# Patient Record
Sex: FEMALE | Race: WHITE | Employment: OTHER | URBAN - METROPOLITAN AREA
[De-identification: names, ages, dates, MRNs, and addresses within clinical notes are randomized per-mention and may not be internally consistent; named-entity substitution may affect disease eponyms.]

---

## 2020-12-21 ENCOUNTER — TELEPHONE (OUTPATIENT)
Dept: INTERNAL MEDICINE CLINIC | Facility: CLINIC | Age: 60
End: 2020-12-21

## 2020-12-21 ENCOUNTER — OFFICE VISIT (OUTPATIENT)
Dept: INTERNAL MEDICINE CLINIC | Facility: CLINIC | Age: 60
End: 2020-12-21
Payer: COMMERCIAL

## 2020-12-21 VITALS
BODY MASS INDEX: 32.18 KG/M2 | TEMPERATURE: 99 F | HEART RATE: 78 BPM | DIASTOLIC BLOOD PRESSURE: 110 MMHG | OXYGEN SATURATION: 99 % | SYSTOLIC BLOOD PRESSURE: 180 MMHG | RESPIRATION RATE: 18 BRPM | WEIGHT: 205 LBS | HEIGHT: 67 IN

## 2020-12-21 DIAGNOSIS — G47.09 OTHER INSOMNIA: ICD-10-CM

## 2020-12-21 DIAGNOSIS — Z01.84 COVID-19 VIRUS IGM ANTIBODY TEST RESULT UNKNOWN: ICD-10-CM

## 2020-12-21 DIAGNOSIS — G44.89 OTHER HEADACHE SYNDROME: ICD-10-CM

## 2020-12-21 DIAGNOSIS — I10 PRIMARY HYPERTENSION: ICD-10-CM

## 2020-12-21 DIAGNOSIS — E78.5 HYPERLIPIDEMIA, UNSPECIFIED HYPERLIPIDEMIA TYPE: Primary | ICD-10-CM

## 2020-12-21 PROCEDURE — 36415 COLL VENOUS BLD VENIPUNCTURE: CPT | Performed by: NURSE PRACTITIONER

## 2020-12-21 PROCEDURE — 3080F DIAST BP >= 90 MM HG: CPT | Performed by: NURSE PRACTITIONER

## 2020-12-21 PROCEDURE — 3008F BODY MASS INDEX DOCD: CPT | Performed by: NURSE PRACTITIONER

## 2020-12-21 PROCEDURE — 3077F SYST BP >= 140 MM HG: CPT | Performed by: NURSE PRACTITIONER

## 2020-12-21 PROCEDURE — 99203 OFFICE O/P NEW LOW 30 MIN: CPT | Performed by: NURSE PRACTITIONER

## 2020-12-21 RX ORDER — LISINOPRIL AND HYDROCHLOROTHIAZIDE 12.5; 1 MG/1; MG/1
1 TABLET ORAL DAILY
Qty: 30 TABLET | Refills: 1 | Status: SHIPPED | OUTPATIENT
Start: 2020-12-21 | End: 2020-12-29 | Stop reason: ALTCHOICE

## 2020-12-21 NOTE — PROGRESS NOTES
BP at home.    Dec 15  -  128/89  96  Dec 16  -  156/103 84   Dec 17  -  152/101  86   Dec 18  -  154/108 86    Dec 20  -  151/91  86                   165/99  88    HA's on/off, today mild HA, No SOB, CP, lightheadedness/dizziness, numbness/tingling to glynn

## 2020-12-21 NOTE — PATIENT INSTRUCTIONS
ASSESSMENT/PLAN:   Hyperlipidemia, unspecified hyperlipidemia type  (primary encounter diagnosis)  Check blood 1-21    Primary hypertension  Careful with diet and excercise at least 30 minutes 3-4 times a week.  Check blood pressures at different times on salt  · Dry beans, cooked without salt  · Tofu, stir-fried without salt  · Unsalted fresh fruit and vegetables, or frozen or canned fruit and vegetables with no added salt  Stay away from  · Lunch or deli meat that is cured or smoked  · Cheese  · Tomato ju such as fatty meats, whole milk, cheese, and palm and coconut oils. Avoid trans fats because they lower good cholesterol as well as raise bad cholesterol.  Trans fats are most often found in processed foods, such as pastries, cookies, pies, muffins, fried f is another way to reduce the amount of fat you eat. · Low-fat and nonfat dairy provide nutrients without a lot of fat. Try low-fat or nonfat milk, cheese, or yogurt. · Healthy fats can be good for you in small amounts.  These are unsaturated fats, such as care. Always follow your healthcare professional's instructions. Exercise for a Healthier Heart     Exercise with a friend. When activity is fun, you're more likely to stick with it. You may wonder how you can improve the health of your heart.  If 75 minutes (1 hour and 15 minutes) or more of vigorous-intensity aerobic activity each week. Or try for a combination of both. Moderate activity means that you breathe heavier and your heart rate increases but you can still talk.  Think about doing 40 minut relieved with self-care. But some headaches may be a sign of another health problem like eye trouble or high blood pressure. To find the best treatment, learn what kind of headaches you get. For tension headaches, self-care will usually help.  To treat migr computer  Signs of migraine  Any of the following can be signs:   · Throbbing pain on one or both sides of your head  · Nausea or vomiting  · Extreme sensitivity to light, sound, and smells  · Bright spots, flashes, or other visual changes  · Pain or nause

## 2020-12-21 NOTE — TELEPHONE ENCOUNTER
Patient contacts clinic as new patient to establish care and report high blood pressure readings. Las few readings were 156/103, 154/108, 165/99. She has a mild, dull headache.   She denies dizziness, lightheadedness, blurred vision, chest pain, shortness

## 2020-12-21 NOTE — PROGRESS NOTES
HPI:    Patient ID: Kevin Merino is a 56year old female. Patient presents to the clinic to establish care. Moved from [place]. Records not in EMR. Patient states she was previously on blood pressure medication 5 years ago.   But was tapered off Cardiovascular: Negative. Gastrointestinal: Negative. Endocrine: Negative. Genitourinary: Negative. Musculoskeletal: Negative. Skin: Negative. Allergic/Immunologic: Negative. Neurological: Positive for headaches.  Negative for dizzine well-developed and well-nourished. She is active. Non-toxic appearance. She does not have a sickly appearance. She does not appear ill. No distress. HENT:   Head: Normocephalic and atraumatic.    Right Ear: Hearing and external ear normal. No decreased h preauricular and no posterior auricular adenopathy present. Head (left side): No submental, no submandibular, no tonsillar, no preauricular and no posterior auricular adenopathy present.    Neurological: She is alert and oriented to person, place, an

## 2020-12-22 DIAGNOSIS — E83.52 SERUM CALCIUM ELEVATED: Primary | ICD-10-CM

## 2020-12-24 ENCOUNTER — PATIENT MESSAGE (OUTPATIENT)
Dept: INTERNAL MEDICINE CLINIC | Facility: CLINIC | Age: 60
End: 2020-12-24

## 2020-12-24 NOTE — TELEPHONE ENCOUNTER
From: Bhargav Moura  To: VIKKI Rivas  Sent: 12/24/2020 5:07 PM CST  Subject: Non-Urgent Medical Question    I am experiencing some pretty heavy abdominal pain after starting the Lisinopril HCTZ/10-12.5mg, also some light headedness.

## 2020-12-26 NOTE — TELEPHONE ENCOUNTER
I have never seen this patient either. She was seen by [facility]. Will forward to her.

## 2020-12-28 ENCOUNTER — TELEPHONE (OUTPATIENT)
Dept: INTERNAL MEDICINE CLINIC | Facility: CLINIC | Age: 60
End: 2020-12-28

## 2020-12-28 ENCOUNTER — NURSE ONLY (OUTPATIENT)
Dept: INTERNAL MEDICINE CLINIC | Facility: CLINIC | Age: 60
End: 2020-12-28
Payer: COMMERCIAL

## 2020-12-28 DIAGNOSIS — Z01.30 BLOOD PRESSURE CHECK: Primary | ICD-10-CM

## 2020-12-28 DIAGNOSIS — Z01.30 BP CHECK: Primary | ICD-10-CM

## 2020-12-28 PROCEDURE — 3079F DIAST BP 80-89 MM HG: CPT | Performed by: INTERNAL MEDICINE

## 2020-12-28 PROCEDURE — 3077F SYST BP >= 140 MM HG: CPT | Performed by: INTERNAL MEDICINE

## 2020-12-28 NOTE — TELEPHONE ENCOUNTER
Patient came in for Blood pressure check \.  BP manually checked on Right Arm sitting 150/80. Patient own BP Machine read 142/88 pulse 103. Patient started to take Lisinopril-hctz 10-12.5mg since last Monday 12/21 and developed a Dry Cough please advise.

## 2020-12-29 RX ORDER — LOSARTAN POTASSIUM AND HYDROCHLOROTHIAZIDE 12.5; 5 MG/1; MG/1
1 TABLET ORAL DAILY
Qty: 30 TABLET | Refills: 1 | Status: SHIPPED | OUTPATIENT
Start: 2020-12-29 | End: 2021-01-11

## 2020-12-29 NOTE — TELEPHONE ENCOUNTER
Spoke to patient develop a consistent cough while taking lisinopril. Took today. Denies chest pain, palpitations, shortness of breath. Advised to continue checking blood pressures daily various times a day. Send blood pressures in 1 to 2 weeks.   Barbie

## 2020-12-29 NOTE — TELEPHONE ENCOUNTER
Please check who has seen the patient even if the PCP is me I may have never seen the patient. Sent to the wrong provider.

## 2021-01-05 ENCOUNTER — NURSE TRIAGE (OUTPATIENT)
Dept: INTERNAL MEDICINE CLINIC | Facility: CLINIC | Age: 61
End: 2021-01-05

## 2021-01-05 ENCOUNTER — PATIENT MESSAGE (OUTPATIENT)
Dept: INTERNAL MEDICINE CLINIC | Facility: CLINIC | Age: 61
End: 2021-01-05

## 2021-01-05 NOTE — TELEPHONE ENCOUNTER
Please triage patient. From: Danyel Beaver  To: VIKKI Mcclelland  Sent: 1/5/2021  8:38 AM CST  Subject: Other    I am flat on my back for the past few days and i think it might be the Losartin. Please let me know what i should do.     Danyel Beaver

## 2021-01-05 NOTE — TELEPHONE ENCOUNTER
From: Belkys London  To: VIKKI Saldana  Sent: 1/5/2021 8:38 AM CST  Subject: Other    I am flat on my back for the past few days and i think it might be the Losartin. Please let me know what i should do.     Belkys London

## 2021-01-06 NOTE — TELEPHONE ENCOUNTER
Action Requested: Summary for Provider     []  Critical Lab, Recommendations Needed  [] Need Additional Advice  [x]   FYI    []   Need Orders  [] Need Medications Sent to Pharmacy  []  Other     SUMMARY: Verified name and .   Patient reports that she has

## 2021-01-07 NOTE — TELEPHONE ENCOUNTER
Not sure why that she was advised to stop blood pressure medication with high blood pressure. No notes in EHR. Where was patient seen? Please schedule follow-up office visit sooner.

## 2021-01-07 NOTE — TELEPHONE ENCOUNTER
Patient stated that she is currently in the car. Still having back pain but able to walk. Just got back from the back specialist. Put her on a steroid and is going to have an MRI. Patient was advised to stop the losartan/hctz- stopped it yesterday.  Blood p

## 2021-01-07 NOTE — TELEPHONE ENCOUNTER
Spoke with pt,  verified  Pt informed of [facility]. Pt saw ortho surgeon from [facility] this morning due to disk problem, he said losartan could be reaction from her coughing which was the side effect from lisinopril.   Pt was switched from lakisha

## 2021-01-08 NOTE — TELEPHONE ENCOUNTER
Patient called and informed of Nicki Valentine message. Agreed to soonest appointment. Re-scheduled for 01/11 at [facility] office. Patient has new insurance information.  Phone call transferred to [facility] to assist

## 2021-01-08 NOTE — TELEPHONE ENCOUNTER
Noted.  Due to High blood pressure patient should still follow-up in the clinic sooner than scheduled.

## 2021-01-11 ENCOUNTER — OFFICE VISIT (OUTPATIENT)
Dept: INTERNAL MEDICINE CLINIC | Facility: CLINIC | Age: 61
End: 2021-01-11
Payer: COMMERCIAL

## 2021-01-11 VITALS
HEART RATE: 103 BPM | SYSTOLIC BLOOD PRESSURE: 180 MMHG | OXYGEN SATURATION: 97 % | BODY MASS INDEX: 32 KG/M2 | TEMPERATURE: 98 F | WEIGHT: 204 LBS | DIASTOLIC BLOOD PRESSURE: 110 MMHG

## 2021-01-11 DIAGNOSIS — E04.1 THYROID NODULE: Primary | ICD-10-CM

## 2021-01-11 DIAGNOSIS — E83.52 SERUM CALCIUM ELEVATED: ICD-10-CM

## 2021-01-11 DIAGNOSIS — M54.50 ACUTE BILATERAL LOW BACK PAIN WITHOUT SCIATICA: ICD-10-CM

## 2021-01-11 DIAGNOSIS — I10 PRIMARY HYPERTENSION: ICD-10-CM

## 2021-01-11 PROCEDURE — 99214 OFFICE O/P EST MOD 30 MIN: CPT | Performed by: NURSE PRACTITIONER

## 2021-01-11 PROCEDURE — 3080F DIAST BP >= 90 MM HG: CPT | Performed by: NURSE PRACTITIONER

## 2021-01-11 PROCEDURE — 3077F SYST BP >= 140 MM HG: CPT | Performed by: NURSE PRACTITIONER

## 2021-01-11 RX ORDER — ASPIRIN 81 MG/1
81 TABLET ORAL DAILY
COMMUNITY

## 2021-01-11 RX ORDER — MULTIVIT-MIN/IRON/FOLIC ACID/K 18-600-40
CAPSULE ORAL
COMMUNITY

## 2021-01-11 RX ORDER — METHYLPREDNISOLONE 4 MG/1
4 TABLET ORAL DAILY
COMMUNITY
End: 2021-02-08 | Stop reason: ALTCHOICE

## 2021-01-11 RX ORDER — METOPROLOL SUCCINATE 25 MG/1
25 TABLET, EXTENDED RELEASE ORAL DAILY
Qty: 30 TABLET | Refills: 1 | Status: SHIPPED | OUTPATIENT
Start: 2021-01-11 | End: 2021-01-25

## 2021-01-11 RX ORDER — OMEGA-3-ACID ETHYL ESTERS 1 G/1
1 CAPSULE, LIQUID FILLED ORAL DAILY
COMMUNITY
End: 2021-08-06

## 2021-01-11 RX ORDER — MULTIVITAMIN WITH IRON
TABLET ORAL
COMMUNITY

## 2021-01-11 RX ORDER — MULTIVIT-MIN/IRON FUM/FOLIC AC 7.5 MG-4
1 TABLET ORAL DAILY
COMMUNITY
End: 2021-01-25

## 2021-01-11 NOTE — PROGRESS NOTES
HPI:    Patient ID: Alina Keyes is a 56year old female. Hypertension  Patient is here for follow up of hypertension. BP at home: 115- 149/82-93's. Has been compliant with medications.   Exercise level: not exercising and HAS  been following low salt d 12/21/2020 02:10 PM     12/21/2020 02:10 PM    CO2 26.0 12/21/2020 02:10 PM    CREATSERUM 0.93 12/21/2020 02:10 PM    CA 10.2 (H) 12/21/2020 02:10 PM    AST 19 12/21/2020 02:10 PM    ALT 24 12/21/2020 02:10 PM        No results found for: Claudia Cannon, HD Negative. Psychiatric/Behavioral: Negative. Current Outpatient Medications   Medication Sig Dispense Refill   • methylPREDNISolone 4 MG Oral Tab Take 4 mg by mouth daily.      • Multiple Vitamins-Minerals (MULTI-VITAMIN/MINERALS) Oral Tab Take 1 Position: Sitting, Cuff Size: adult)   Pulse 103   Temp 98.4 °F (36.9 °C) (Temporal)   Wt 204 lb (92.5 kg)   SpO2 97%   BMI 31.95 kg/m²   BP Readings from Last 3 Encounters:  01/11/21 : (!) 162/97  12/21/20 : (!) 180/110    Wt Readings from Last 3 Encounte She has no wheezes. She has no rales. She exhibits no tenderness. Abdominal: Soft. Normal appearance and bowel sounds are normal. She exhibits no distension and no mass. There is no hepatosplenomegaly. There is no abdominal tenderness.  There is no reboun experience significant low back pain sometime during their lifetime. Low back pain usually involves muscle spasm of the supportive muscles along the spine. Also, pain, numbness and  tingling in the buttocks or lower extremity can be related to the back.  Delma Joya sitting, driving, bending, heavy lifting and twisting. ICE: Ice applied to the low back for 20 minutes every 1 – 2 hours is helpful in reducing pain and spasm. DO NOT use heat unless otherwise instructed-this may lead to increase in muscle spasm and pain. Prescriptions     Signed Prescriptions Disp Refills   • Metoprolol Succinate ER 25 MG Oral Tablet 24 Hr 30 tablet 1     Sig: Take 1 tablet (25 mg total) by mouth daily.        Imaging & Referrals:  US THYROID (SVU=[id number])

## 2021-01-11 NOTE — PATIENT INSTRUCTIONS
ASSESSMENT/PLAN:   Thyroid nodule  (primary encounter diagnosis)  Ordered ultrasound of thyroid    Need medical records      Primary hypertension  Careful with diet and excercise at least 30 minutes 3-4 times a week.  Check blood pressures at different ti degenerative joint disease). Normal aging causes   decreased bone density, strength and elasticity of muscles and   ligaments.  These effects can be minimized by regular exercise,   proper lifting and moving techniques, proper nutrition and   body compositi slumped or over-arched. Proper lifting and body mechanics.   See your health care provider if you have the following: significant pain that persists beyond a week, unexplained fever,  unexplained weight loss, redness or swelling on the back or spine, pain

## 2021-01-13 ENCOUNTER — LAB ENCOUNTER (OUTPATIENT)
Dept: LAB | Facility: HOSPITAL | Age: 61
End: 2021-01-13
Attending: NURSE PRACTITIONER
Payer: COMMERCIAL

## 2021-01-13 DIAGNOSIS — E83.52 SERUM CALCIUM ELEVATED: ICD-10-CM

## 2021-01-13 LAB
ALBUMIN SERPL-MCNC: 3.9 G/DL (ref 3.4–5)
ALBUMIN/GLOB SERPL: 1 {RATIO} (ref 1–2)
ALP LIVER SERPL-CCNC: 108 U/L
ALT SERPL-CCNC: 20 U/L
ANION GAP SERPL CALC-SCNC: 6 MMOL/L (ref 0–18)
AST SERPL-CCNC: 12 U/L (ref 15–37)
BILIRUB SERPL-MCNC: 0.4 MG/DL (ref 0.1–2)
BUN BLD-MCNC: 24 MG/DL (ref 7–18)
BUN/CREAT SERPL: 24 (ref 10–20)
CALCIUM BLD-MCNC: 9.4 MG/DL (ref 8.5–10.1)
CHLORIDE SERPL-SCNC: 103 MMOL/L (ref 98–112)
CO2 SERPL-SCNC: 28 MMOL/L (ref 21–32)
CREAT BLD-MCNC: 1 MG/DL
GLOBULIN PLAS-MCNC: 3.9 G/DL (ref 2.8–4.4)
GLUCOSE BLD-MCNC: 114 MG/DL (ref 70–99)
M PROTEIN MFR SERPL ELPH: 7.8 G/DL (ref 6.4–8.2)
OSMOLALITY SERPL CALC.SUM OF ELEC: 289 MOSM/KG (ref 275–295)
PATIENT FASTING Y/N/NP: NO
POTASSIUM SERPL-SCNC: 4 MMOL/L (ref 3.5–5.1)
PTH-INTACT SERPL-MCNC: 45.5 PG/ML (ref 18.5–88)
SODIUM SERPL-SCNC: 137 MMOL/L (ref 136–145)
TSI SER-ACNC: 1.45 MIU/ML (ref 0.36–3.74)

## 2021-01-13 PROCEDURE — 82306 VITAMIN D 25 HYDROXY: CPT

## 2021-01-13 PROCEDURE — 84165 PROTEIN E-PHORESIS SERUM: CPT

## 2021-01-13 PROCEDURE — 83883 ASSAY NEPHELOMETRY NOT SPEC: CPT

## 2021-01-13 PROCEDURE — 36415 COLL VENOUS BLD VENIPUNCTURE: CPT

## 2021-01-13 PROCEDURE — 84443 ASSAY THYROID STIM HORMONE: CPT

## 2021-01-13 PROCEDURE — 83970 ASSAY OF PARATHORMONE: CPT

## 2021-01-13 PROCEDURE — 82330 ASSAY OF CALCIUM: CPT

## 2021-01-13 PROCEDURE — 80053 COMPREHEN METABOLIC PANEL: CPT

## 2021-01-14 LAB
CALCIUM IONIZED PH 7.4: 1.24 MMOL/L
CALCIUM IONIZED, SERUM: 1.27 MMOL/L
KAPPA LC FREE SER-MCNC: 1.94 MG/DL (ref 0.33–1.94)
KAPPA LC FREE/LAMBDA FREE SER NEPH: 1.99 {RATIO} (ref 0.26–1.65)
LAMBDA LC FREE SERPL-MCNC: 0.97 MG/DL (ref 0.57–2.63)

## 2021-01-15 LAB
25(OH)D3 SERPL-MCNC: 43.9 NG/ML (ref 30–100)
ALBUMIN SERPL ELPH-MCNC: 4.45 G/DL (ref 3.75–5.21)
ALBUMIN/GLOB SERPL: 1.62 {RATIO} (ref 1–2)
ALPHA1 GLOB SERPL ELPH-MCNC: 0.29 G/DL (ref 0.19–0.46)
ALPHA2 GLOB SERPL ELPH-MCNC: 0.81 G/DL (ref 0.48–1.05)
B-GLOBULIN SERPL ELPH-MCNC: 0.87 G/DL (ref 0.68–1.23)
GAMMA GLOB SERPL ELPH-MCNC: 0.78 G/DL (ref 0.62–1.7)
M PROTEIN MFR SERPL ELPH: 7.2 G/DL (ref 6.4–8.2)

## 2021-01-18 ENCOUNTER — TELEPHONE (OUTPATIENT)
Dept: INTERNAL MEDICINE CLINIC | Facility: CLINIC | Age: 61
End: 2021-01-18

## 2021-01-18 ENCOUNTER — NURSE ONLY (OUTPATIENT)
Dept: INTERNAL MEDICINE CLINIC | Facility: CLINIC | Age: 61
End: 2021-01-18
Payer: COMMERCIAL

## 2021-01-18 VITALS — HEART RATE: 94 BPM | SYSTOLIC BLOOD PRESSURE: 151 MMHG | DIASTOLIC BLOOD PRESSURE: 92 MMHG

## 2021-01-18 DIAGNOSIS — Z01.30 BLOOD PRESSURE CHECK: Primary | ICD-10-CM

## 2021-01-18 PROCEDURE — 3080F DIAST BP >= 90 MM HG: CPT | Performed by: NURSE PRACTITIONER

## 2021-01-18 PROCEDURE — 3077F SYST BP >= 140 MM HG: CPT | Performed by: NURSE PRACTITIONER

## 2021-01-19 VITALS — DIASTOLIC BLOOD PRESSURE: 85 MMHG | SYSTOLIC BLOOD PRESSURE: 172 MMHG | HEART RATE: 81 BPM

## 2021-01-19 NOTE — PROGRESS NOTES
Patient presents for BP check. Name and  verified. Patient states she did take her BP meds this morning. Patient's resting BP was 172/85  with a pulse of 81 via SpotMonitor.

## 2021-01-22 DIAGNOSIS — E83.52 SERUM CALCIUM ELEVATED: Primary | ICD-10-CM

## 2021-01-25 ENCOUNTER — MED REC SCAN ONLY (OUTPATIENT)
Dept: INTERNAL MEDICINE CLINIC | Facility: CLINIC | Age: 61
End: 2021-01-25

## 2021-01-25 ENCOUNTER — OFFICE VISIT (OUTPATIENT)
Dept: INTERNAL MEDICINE CLINIC | Facility: CLINIC | Age: 61
End: 2021-01-25
Payer: COMMERCIAL

## 2021-01-25 VITALS
OXYGEN SATURATION: 98 % | DIASTOLIC BLOOD PRESSURE: 90 MMHG | SYSTOLIC BLOOD PRESSURE: 146 MMHG | TEMPERATURE: 98 F | WEIGHT: 206 LBS | BODY MASS INDEX: 32 KG/M2 | HEART RATE: 88 BPM

## 2021-01-25 DIAGNOSIS — Z00.00 ANNUAL PHYSICAL EXAM: ICD-10-CM

## 2021-01-25 DIAGNOSIS — E04.9 GOITER: ICD-10-CM

## 2021-01-25 DIAGNOSIS — I10 PRIMARY HYPERTENSION: Primary | ICD-10-CM

## 2021-01-25 DIAGNOSIS — M54.50 ACUTE BILATERAL LOW BACK PAIN WITHOUT SCIATICA: ICD-10-CM

## 2021-01-25 LAB
APPEARANCE: CLEAR
BASOPHILS # BLD AUTO: 0.04 X10(3) UL (ref 0–0.2)
BASOPHILS NFR BLD AUTO: 0.6 %
BILIRUBIN: NEGATIVE
CHOLEST SMN-MCNC: 286 MG/DL (ref ?–200)
DEPRECATED RDW RBC AUTO: 47.4 FL (ref 35.1–46.3)
EOSINOPHIL # BLD AUTO: 0.23 X10(3) UL (ref 0–0.7)
EOSINOPHIL NFR BLD AUTO: 3.3 %
ERYTHROCYTE [DISTWIDTH] IN BLOOD BY AUTOMATED COUNT: 14.1 % (ref 11–15)
GLUCOSE (URINE DIPSTICK): NEGATIVE MG/DL
HCT VFR BLD AUTO: 40.3 %
HDLC SERPL-MCNC: 47 MG/DL (ref 40–59)
HGB BLD-MCNC: 12.6 G/DL
IMM GRANULOCYTES # BLD AUTO: 0.02 X10(3) UL (ref 0–1)
IMM GRANULOCYTES NFR BLD: 0.3 %
KETONES (URINE DIPSTICK): NEGATIVE MG/DL
LDLC SERPL CALC-MCNC: 182 MG/DL (ref ?–100)
LEUKOCYTES: NEGATIVE
LYMPHOCYTES # BLD AUTO: 2.02 X10(3) UL (ref 1–4)
LYMPHOCYTES NFR BLD AUTO: 28.8 %
MCH RBC QN AUTO: 28.5 PG (ref 26–34)
MCHC RBC AUTO-ENTMCNC: 31.3 G/DL (ref 31–37)
MCV RBC AUTO: 91.2 FL
MONOCYTES # BLD AUTO: 0.71 X10(3) UL (ref 0.1–1)
MONOCYTES NFR BLD AUTO: 10.1 %
MULTISTIX LOT#: 1044 NUMERIC
NEUTROPHILS # BLD AUTO: 4 X10 (3) UL (ref 1.5–7.7)
NEUTROPHILS # BLD AUTO: 4 X10(3) UL (ref 1.5–7.7)
NEUTROPHILS NFR BLD AUTO: 56.9 %
NITRITE, URINE: NEGATIVE
NONHDLC SERPL-MCNC: 239 MG/DL (ref ?–130)
OCCULT BLOOD: NEGATIVE
PATIENT FASTING Y/N/NP: YES
PH, URINE: 5.5 (ref 4.5–8)
PLATELET # BLD AUTO: 293 10(3)UL (ref 150–450)
PROTEIN (URINE DIPSTICK): 0.2 MG/DL
RBC # BLD AUTO: 4.42 X10(6)UL
SPECIFIC GRAVITY: 1.02 (ref 1–1.03)
TRIGL SERPL-MCNC: 284 MG/DL (ref 30–149)
URINE-COLOR: YELLOW
UROBILINOGEN,SEMI-QN: 0.2 MG/DL (ref 0–1.9)
VLDLC SERPL CALC-MCNC: 57 MG/DL (ref 0–30)
WBC # BLD AUTO: 7 X10(3) UL (ref 4–11)

## 2021-01-25 PROCEDURE — 3080F DIAST BP >= 90 MM HG: CPT | Performed by: NURSE PRACTITIONER

## 2021-01-25 PROCEDURE — 93000 ELECTROCARDIOGRAM COMPLETE: CPT | Performed by: NURSE PRACTITIONER

## 2021-01-25 PROCEDURE — 36415 COLL VENOUS BLD VENIPUNCTURE: CPT | Performed by: NURSE PRACTITIONER

## 2021-01-25 PROCEDURE — 99396 PREV VISIT EST AGE 40-64: CPT | Performed by: NURSE PRACTITIONER

## 2021-01-25 PROCEDURE — 3077F SYST BP >= 140 MM HG: CPT | Performed by: NURSE PRACTITIONER

## 2021-01-25 PROCEDURE — 81003 URINALYSIS AUTO W/O SCOPE: CPT | Performed by: NURSE PRACTITIONER

## 2021-01-25 RX ORDER — METOPROLOL SUCCINATE 25 MG/1
25 TABLET, EXTENDED RELEASE ORAL DAILY
Qty: 30 TABLET | Refills: 2 | Status: SHIPPED | OUTPATIENT
Start: 2021-01-25 | End: 2021-02-08

## 2021-01-25 NOTE — PATIENT INSTRUCTIONS
ASSESSMENT/PLAN:   Primary hypertension  (primary encounter diagnosis)  Careful with diet and excercise at least 30 minutes 3-4 times a week. Check blood pressures at different times on different days. Bake foods more and grill occasionally.  Avoid fried cereal, coffee, or tea. This includes white and brown table sugar, syrup, honey, and molasses. Cut your usual amount by half. · Use non-sugar sweeteners. Stevia, aspartame, and sucralose can satisfy a sweet tooth without adding calories.   · Swap out sugar reviewed this educational content on 7/1/2020  © 0345-1882 [employer]. [address] [facility], [facility] [place]. All rights reserved. This information is not intended as a substitute for professional medical care.  Always follow your healthc

## 2021-01-25 NOTE — PROGRESS NOTES
HPI:    Patient ID: Edward Headley is a 56year old female. Edward Headley is a 56year old female who presents for a complete physical exam.   HPI:   Patient presents with:  Physical: only       No LMP recorded.   Postmenopausal.  Sees gynecolog Succinate ER 25 MG Oral Tablet 24 Hr Take 1 tablet (25 mg total) by mouth daily. 30 tablet 2   • aspirin 81 MG Oral Tab EC Take 81 mg by mouth daily. • Multiple Vitamins-Minerals (OCUVITE ADULT 50+ OR) Take by mouth.      • B Complex-C-Folic Acid Oral T Exercises at home 7 days a week 30 minutes and HAS been following low salt diet. Weight has been stable. Denies chest pain palpitation, shortness of breath. WAS on BP meds about 5yrs ago, bp stabilized and MD at [facility] okay-ed her to stop BP meds.    12-20 AM        No results found for: CHOLEST, HDL, TRIG, LDL, NONHDLC       HPI      Review of Systems   Constitutional: Negative. HENT: Negative. Eyes: Negative. Respiratory: Negative. Cardiovascular: Negative. Gastrointestinal: Negative.     End standard drinks      Types: 2 Glasses of wine per week      Frequency: Monthly or less      Comment: on wkends    Drug use: Never       PHYSICAL EXAM:   /80 (BP Location: Left arm, Patient Position: Sitting, Cuff Size: adult)   Pulse 88   Temp 97.7 ° distension and no mass. There is no hepatosplenomegaly, splenomegaly or hepatomegaly. There is no abdominal tenderness. There is no rebound and no guarding.    Genitourinary:    Genitourinary Comments: Patient refused Pap smear during today's exam.     Musc Referrals:  ELECTROCARDIOGRAM, COMPLETE

## 2021-01-26 ENCOUNTER — TELEPHONE (OUTPATIENT)
Dept: INTERNAL MEDICINE CLINIC | Facility: CLINIC | Age: 61
End: 2021-01-26

## 2021-01-26 NOTE — TELEPHONE ENCOUNTER
Spoke to patient regarding cholesterol levels and elevated blood pressure. Discussed increased risk of heart attack and stroke. Discussed starting a statin 2 times a week or Nexletol to reduce risk. Patient refused.   Patient states she is supposed to be

## 2021-01-27 ENCOUNTER — TELEPHONE (OUTPATIENT)
Dept: INTERNAL MEDICINE CLINIC | Facility: CLINIC | Age: 61
End: 2021-01-27

## 2021-01-28 NOTE — TELEPHONE ENCOUNTER
Message left for pt to either call [facility] and have them send her release of info to sign, or she can come to our office and sign form and give us more information on where to get records from.

## 2021-02-02 ENCOUNTER — TELEPHONE (OUTPATIENT)
Dept: INTERNAL MEDICINE CLINIC | Facility: CLINIC | Age: 61
End: 2021-02-02

## 2021-02-02 PROBLEM — M54.50 CHRONIC LOWER BACK PAIN: Status: ACTIVE | Noted: 2021-01-11

## 2021-02-02 PROBLEM — G89.29 CHRONIC LOWER BACK PAIN: Status: ACTIVE | Noted: 2021-01-11

## 2021-02-02 PROBLEM — M54.50 ACUTE BILATERAL LOW BACK PAIN WITHOUT SCIATICA: Chronic | Status: ACTIVE | Noted: 2021-01-11

## 2021-02-02 PROBLEM — M54.50 ACUTE BILATERAL LOW BACK PAIN WITHOUT SCIATICA: Chronic | Status: RESOLVED | Noted: 2021-01-11 | Resolved: 2021-02-02

## 2021-02-02 PROBLEM — Z87.19 HX OF DIVERTICULITIS OF COLON: Status: ACTIVE | Noted: 2021-02-02

## 2021-02-02 PROBLEM — E78.2 MIXED HYPERLIPIDEMIA: Status: ACTIVE | Noted: 2021-02-02

## 2021-02-02 PROBLEM — E04.1 THYROID NODULE: Status: ACTIVE | Noted: 2021-02-02

## 2021-02-02 NOTE — TELEPHONE ENCOUNTER
Spoke to patient received prior medical records from [facility]. Thyroid ultrasound on 4/8/2019 shows right isthmic solid and cystic nodule measuring up to 1.3 cm. Few less than 0.5 cm hypoechoic nodules within the left lobe.   Records sent to

## 2021-02-08 ENCOUNTER — OFFICE VISIT (OUTPATIENT)
Dept: INTERNAL MEDICINE CLINIC | Facility: CLINIC | Age: 61
End: 2021-02-08
Payer: COMMERCIAL

## 2021-02-08 VITALS
WEIGHT: 206 LBS | DIASTOLIC BLOOD PRESSURE: 90 MMHG | HEART RATE: 92 BPM | BODY MASS INDEX: 32 KG/M2 | OXYGEN SATURATION: 98 % | SYSTOLIC BLOOD PRESSURE: 150 MMHG | TEMPERATURE: 98 F

## 2021-02-08 DIAGNOSIS — M54.50 ACUTE BILATERAL LOW BACK PAIN WITHOUT SCIATICA: Primary | ICD-10-CM

## 2021-02-08 DIAGNOSIS — R42 POSITIONAL LIGHTHEADEDNESS: ICD-10-CM

## 2021-02-08 DIAGNOSIS — I10 PRIMARY HYPERTENSION: ICD-10-CM

## 2021-02-08 DIAGNOSIS — M54.50 CHRONIC BILATERAL LOW BACK PAIN WITHOUT SCIATICA: ICD-10-CM

## 2021-02-08 DIAGNOSIS — G89.29 CHRONIC BILATERAL LOW BACK PAIN WITHOUT SCIATICA: ICD-10-CM

## 2021-02-08 DIAGNOSIS — E78.2 MIXED HYPERLIPIDEMIA: ICD-10-CM

## 2021-02-08 LAB
APPEARANCE: CLEAR
BILIRUB UR QL: NEGATIVE
BILIRUBIN: NEGATIVE
COLOR UR: YELLOW
GLUCOSE (URINE DIPSTICK): NEGATIVE MG/DL
GLUCOSE UR-MCNC: NEGATIVE MG/DL
HGB UR QL STRIP.AUTO: NEGATIVE
KETONES (URINE DIPSTICK): NEGATIVE MG/DL
KETONES UR-MCNC: NEGATIVE MG/DL
LEUKOCYTE ESTERASE UR QL STRIP.AUTO: NEGATIVE
LEUKOCYTES: NEGATIVE
MULTISTIX LOT#: 5077 NUMERIC
NITRITE UR QL STRIP.AUTO: NEGATIVE
NITRITE, URINE: NEGATIVE
OCCULT BLOOD: NEGATIVE
PH UR: 6 [PH] (ref 5–8)
PH, URINE: 6 (ref 4.5–8)
PROT UR-MCNC: NEGATIVE MG/DL
PROTEIN (URINE DIPSTICK): NEGATIVE MG/DL
RBC #/AREA URNS AUTO: 1 /HPF
SP GR UR STRIP: 1.02 (ref 1–1.03)
SPECIFIC GRAVITY: 1.02 (ref 1–1.03)
URINE-COLOR: YELLOW
UROBILINOGEN UR STRIP-ACNC: <2
UROBILINOGEN,SEMI-QN: 0.2 MG/DL (ref 0–1.9)
WBC #/AREA URNS AUTO: 1 /HPF

## 2021-02-08 PROCEDURE — 3077F SYST BP >= 140 MM HG: CPT | Performed by: NURSE PRACTITIONER

## 2021-02-08 PROCEDURE — 3080F DIAST BP >= 90 MM HG: CPT | Performed by: NURSE PRACTITIONER

## 2021-02-08 PROCEDURE — 99214 OFFICE O/P EST MOD 30 MIN: CPT | Performed by: NURSE PRACTITIONER

## 2021-02-08 PROCEDURE — 81003 URINALYSIS AUTO W/O SCOPE: CPT | Performed by: NURSE PRACTITIONER

## 2021-02-08 RX ORDER — METOPROLOL SUCCINATE 50 MG/1
25 TABLET, EXTENDED RELEASE ORAL DAILY
Qty: 30 TABLET | Refills: 1 | Status: SHIPPED | OUTPATIENT
Start: 2021-02-08 | End: 2021-02-08

## 2021-02-08 RX ORDER — METOPROLOL SUCCINATE 50 MG/1
TABLET, EXTENDED RELEASE ORAL
Qty: 30 TABLET | Refills: 1 | Status: SHIPPED | OUTPATIENT
Start: 2021-02-08 | End: 2021-04-06

## 2021-02-08 NOTE — TELEPHONE ENCOUNTER
Per pt,states that she did request medical records from the [address] [place] [address],[address] and the they were sent to our office.

## 2021-02-08 NOTE — PROGRESS NOTES
HPI:    Patient ID: Jennyfer Fatima is a 56year old female. Hypertension  Patient is here for follow up of hypertension. BP at home: 113-136/81-94's. Has been compliant with medications.   Exercise level: stretching 7 days a week 30 minutes and HAS TRIG 284 (H) 01/25/2021 05:07 PM     (H) 01/25/2021 05:07 PM    NONHDLC 239 (H) 01/25/2021 05:07 PM           Wt Readings from Last 3 Encounters:  02/08/21 : 206 lb (93.4 kg)  01/25/21 : 206 lb (93.4 kg)  01/11/21 : 204 lb (92.5 kg)    BP Readings • Metoprolol Succinate ER 50 MG Oral Tablet 24 Hr Take 1 tablet ( 50 mg) daily. 30 tablet 1   • aspirin 81 MG Oral Tab EC Take 81 mg by mouth daily. • Multiple Vitamins-Minerals (OCUVITE ADULT 50+ OR) Take by mouth.      • B Complex-C-Folic Acid Oral Ta Physical Exam   Constitutional: She is oriented to person, place, and time. Vital signs are normal. She appears well-developed and well-nourished. She is active. Non-toxic appearance. She does not have a sickly appearance. She does not appear ill.  No dis Head (right side): No submental, no submandibular, no tonsillar, no preauricular and no posterior auricular adenopathy present.         Head (left side): No submental, no submandibular, no tonsillar, no preauricular and no posterior auricular adenopath

## 2021-02-08 NOTE — PATIENT INSTRUCTIONS
ASSESSMENT/PLAN:   Acute bilateral low back pain without sciatica  (primary encounter diagnosis)  Check urine    Chronic bilateral low back pain without sciatica  Continue with physical therapy  Follow-up with surgeon    Primary hypertension  Max

## 2021-02-11 ENCOUNTER — HOSPITAL ENCOUNTER (OUTPATIENT)
Dept: ULTRASOUND IMAGING | Facility: HOSPITAL | Age: 61
Discharge: HOME | End: 2021-02-11
Attending: NURSE PRACTITIONER
Payer: COMMERCIAL

## 2021-02-11 DIAGNOSIS — R42 POSITIONAL LIGHTHEADEDNESS: ICD-10-CM

## 2021-02-11 PROCEDURE — 93880 EXTRACRANIAL BILAT STUDY: CPT | Performed by: NURSE PRACTITIONER

## 2021-02-15 ENCOUNTER — HOSPITAL ENCOUNTER (OUTPATIENT)
Dept: ULTRASOUND IMAGING | Facility: HOSPITAL | Age: 61
Discharge: HOME | End: 2021-02-15
Attending: NURSE PRACTITIONER
Payer: COMMERCIAL

## 2021-02-15 DIAGNOSIS — E04.1 THYROID NODULE: ICD-10-CM

## 2021-02-15 PROCEDURE — 76536 US EXAM OF HEAD AND NECK: CPT | Performed by: NURSE PRACTITIONER

## 2021-02-17 ENCOUNTER — TELEPHONE (OUTPATIENT)
Dept: INTERNAL MEDICINE CLINIC | Facility: CLINIC | Age: 61
End: 2021-02-17

## 2021-02-17 NOTE — TELEPHONE ENCOUNTER
Spoke to pt, re. 2/16/21 ultrasound of thyroid results. Pt states she does not have CD of ultrasound needed by radiologist for comparison. Pt has already completed medical records form are we able to get a copy of the CD of her ultrasound of thyroid?

## 2021-02-26 NOTE — TELEPHONE ENCOUNTER
Spoke with Rich Nation from [facility] [address] [facility] Customer Service and requested that the most recent CD of thyroid ultrasound to be sent to our office, informed her release was sent to them and she will have CD sent to the [facility]

## 2021-02-27 NOTE — TELEPHONE ENCOUNTER
Noted, once CD is received should be sent to radiologist for review. See 2021 ultrasound thyroid results.

## 2021-03-02 ENCOUNTER — MED REC SCAN ONLY (OUTPATIENT)
Dept: INTERNAL MEDICINE CLINIC | Facility: CLINIC | Age: 61
End: 2021-03-02

## 2021-04-06 RX ORDER — METOPROLOL SUCCINATE 50 MG/1
TABLET, EXTENDED RELEASE ORAL
Qty: 30 TABLET | Refills: 1 | Status: SHIPPED | OUTPATIENT
Start: 2021-04-06 | End: 2021-06-09

## 2021-04-27 ENCOUNTER — TELEPHONE (OUTPATIENT)
Dept: INTERNAL MEDICINE CLINIC | Facility: CLINIC | Age: 61
End: 2021-04-27

## 2021-05-03 ENCOUNTER — TELEMEDICINE (OUTPATIENT)
Dept: INTERNAL MEDICINE CLINIC | Facility: CLINIC | Age: 61
End: 2021-05-03

## 2021-05-03 DIAGNOSIS — E78.2 MIXED HYPERLIPIDEMIA: ICD-10-CM

## 2021-05-03 DIAGNOSIS — Z12.11 COLON CANCER SCREENING: ICD-10-CM

## 2021-05-03 DIAGNOSIS — I10 PRIMARY HYPERTENSION: Primary | ICD-10-CM

## 2021-05-03 PROCEDURE — 99213 OFFICE O/P EST LOW 20 MIN: CPT | Performed by: NURSE PRACTITIONER

## 2021-05-03 NOTE — PROGRESS NOTES
Video Check-In    Jeweleta  verbally consents to a Video Check-In visit on 05/03/21. Patient understands and accepts financial responsibility for any deductible, co-insurance and/or co-pays associated with this service.     [place] is in playing tennis and HAS been following low salt diet. Weight has been stable. BP checked during video visit. Taking Cholestoff 900mg 2 in AM 3 PM  Tried CoQ10 had sympt. Caused back pain. Tried statin in [place] had sympt.  Pt refused other statins   Mammogr monitor. If not, check at local pharmacy. Bake foods more and grill occasionally. Avoid fried foods. No salt. Use other seasonings. Check blood  Follow up in 1 week for RN visit BP check  -     COMP METABOLIC PANEL (14);  Future    Mixed hyperlipidemia  C

## 2021-05-07 ENCOUNTER — TELEPHONE (OUTPATIENT)
Dept: INTERNAL MEDICINE CLINIC | Facility: CLINIC | Age: 61
End: 2021-05-07

## 2021-05-07 ENCOUNTER — NURSE ONLY (OUTPATIENT)
Dept: INTERNAL MEDICINE CLINIC | Facility: CLINIC | Age: 61
End: 2021-05-07
Payer: COMMERCIAL

## 2021-05-07 VITALS — SYSTOLIC BLOOD PRESSURE: 140 MMHG | HEART RATE: 93 BPM | DIASTOLIC BLOOD PRESSURE: 82 MMHG

## 2021-05-07 DIAGNOSIS — Z01.30 BLOOD PRESSURE CHECK: Primary | ICD-10-CM

## 2021-05-07 PROCEDURE — 3077F SYST BP >= 140 MM HG: CPT | Performed by: NURSE PRACTITIONER

## 2021-05-07 PROCEDURE — 3079F DIAST BP 80-89 MM HG: CPT | Performed by: NURSE PRACTITIONER

## 2021-05-11 NOTE — TELEPHONE ENCOUNTER
Patient blood pressure check with nurse visit reviewed. Patient's blood pressure remains slightly elevated. ? BP well controlled at home. Patient on metoprolol 50 mg daily. Will recheck during office visit as scheduled with Dr. Bridget Waldrop.

## 2021-06-09 RX ORDER — METOPROLOL SUCCINATE 50 MG/1
TABLET, EXTENDED RELEASE ORAL
Qty: 90 TABLET | Refills: 0 | Status: SHIPPED | OUTPATIENT
Start: 2021-06-09 | End: 2021-08-06

## 2021-07-12 ENCOUNTER — HOSPITAL ENCOUNTER (OUTPATIENT)
Age: 61
Discharge: HOME | End: 2021-07-12
Payer: COMMERCIAL

## 2021-07-12 VITALS
HEART RATE: 96 BPM | SYSTOLIC BLOOD PRESSURE: 174 MMHG | DIASTOLIC BLOOD PRESSURE: 95 MMHG | RESPIRATION RATE: 16 BRPM | TEMPERATURE: 97 F | OXYGEN SATURATION: 99 %

## 2021-07-12 DIAGNOSIS — H04.201 EYE TEARING, RIGHT: ICD-10-CM

## 2021-07-12 DIAGNOSIS — Z11.52 ENCOUNTER FOR SCREENING FOR COVID-19: Primary | ICD-10-CM

## 2021-07-12 LAB — SARS-COV-2 RNA RESP QL NAA+PROBE: NOT DETECTED

## 2021-07-12 PROCEDURE — 99203 OFFICE O/P NEW LOW 30 MIN: CPT | Performed by: NURSE PRACTITIONER

## 2021-07-12 PROCEDURE — U0002 COVID-19 LAB TEST NON-CDC: HCPCS | Performed by: NURSE PRACTITIONER

## 2021-07-12 RX ORDER — POLYMYXIN B SULFATE AND TRIMETHOPRIM 1; 10000 MG/ML; [USP'U]/ML
1 SOLUTION OPHTHALMIC EVERY 6 HOURS
Qty: 10 ML | Refills: 0 | Status: SHIPPED | OUTPATIENT
Start: 2021-07-12 | End: 2021-07-17

## 2021-07-12 NOTE — ED PROVIDER NOTES
Patient Seen in: [facility]      History   Patient presents with:  Eye Problem    Stated Complaint: Eye problem    HPI/Subjective:   HPI    41-year-old female presents to the immediate care with complaints of right eye tearing and itching si 20/25, Uncorrected  Left Eye Chart Acuity: 20/40, Uncorrected    Physical Exam  Vitals and nursing note reviewed. Constitutional:       Appearance: Normal appearance. HENT:      Nose: Congestion present.    Eyes:      General: Lids are normal.      Extr possible for a visit   As needed          Medications Prescribed:  Current Discharge Medication List    START taking these medications    Polymyxin B-Trimethoprim 51583-7.1 UNIT/ML-% Ophthalmic Solution  Apply 1 drop to eye every 6 (six) hours for 5 days.

## 2021-07-29 PROBLEM — Z12.4 PAP SMEAR FOR CERVICAL CANCER SCREENING: Status: ACTIVE | Noted: 2021-07-29

## 2021-07-29 PROBLEM — Z12.11 COLON CANCER SCREENING: Status: ACTIVE | Noted: 2021-07-29

## 2021-07-29 PROBLEM — Z71.85 VACCINE COUNSELING: Status: ACTIVE | Noted: 2021-07-29

## 2021-07-30 ENCOUNTER — PATIENT MESSAGE (OUTPATIENT)
Dept: INTERNAL MEDICINE CLINIC | Facility: CLINIC | Age: 61
End: 2021-07-30

## 2021-07-30 ENCOUNTER — OFFICE VISIT (OUTPATIENT)
Dept: INTERNAL MEDICINE CLINIC | Facility: CLINIC | Age: 61
End: 2021-07-30
Payer: COMMERCIAL

## 2021-07-30 ENCOUNTER — LAB ENCOUNTER (OUTPATIENT)
Dept: LAB | Facility: REFERENCE LAB | Age: 61
End: 2021-07-30
Attending: NURSE PRACTITIONER
Payer: COMMERCIAL

## 2021-07-30 VITALS
BODY MASS INDEX: 33.15 KG/M2 | RESPIRATION RATE: 18 BRPM | HEART RATE: 90 BPM | WEIGHT: 211.19 LBS | HEIGHT: 67 IN | DIASTOLIC BLOOD PRESSURE: 88 MMHG | TEMPERATURE: 97 F | SYSTOLIC BLOOD PRESSURE: 148 MMHG

## 2021-07-30 DIAGNOSIS — Z12.4 PAP SMEAR FOR CERVICAL CANCER SCREENING: ICD-10-CM

## 2021-07-30 DIAGNOSIS — I10 PRIMARY HYPERTENSION: ICD-10-CM

## 2021-07-30 DIAGNOSIS — E04.9 GOITER: Primary | ICD-10-CM

## 2021-07-30 DIAGNOSIS — Z12.11 COLON CANCER SCREENING: ICD-10-CM

## 2021-07-30 DIAGNOSIS — E78.2 MIXED HYPERLIPIDEMIA: ICD-10-CM

## 2021-07-30 DIAGNOSIS — R73.9 HYPERGLYCEMIA: ICD-10-CM

## 2021-07-30 DIAGNOSIS — E83.52 SERUM CALCIUM ELEVATED: ICD-10-CM

## 2021-07-30 DIAGNOSIS — E04.1 THYROID NODULE: ICD-10-CM

## 2021-07-30 DIAGNOSIS — Z71.85 VACCINE COUNSELING: ICD-10-CM

## 2021-07-30 LAB
ALBUMIN SERPL-MCNC: 4 G/DL (ref 3.4–5)
ALBUMIN/GLOB SERPL: 1.1 {RATIO} (ref 1–2)
ALP LIVER SERPL-CCNC: 83 U/L
ALT SERPL-CCNC: 26 U/L
ANION GAP SERPL CALC-SCNC: 9 MMOL/L (ref 0–18)
AST SERPL-CCNC: 17 U/L (ref 15–37)
BILIRUB SERPL-MCNC: 0.5 MG/DL (ref 0.1–2)
BUN BLD-MCNC: 18 MG/DL (ref 7–18)
BUN/CREAT SERPL: 18.8 (ref 10–20)
CALCIUM BLD-MCNC: 9.1 MG/DL (ref 8.5–10.1)
CHLORIDE SERPL-SCNC: 106 MMOL/L (ref 98–112)
CHOLEST SMN-MCNC: 282 MG/DL (ref ?–200)
CO2 SERPL-SCNC: 24 MMOL/L (ref 21–32)
CREAT BLD-MCNC: 0.96 MG/DL
EST. AVERAGE GLUCOSE BLD GHB EST-MCNC: 137 MG/DL (ref 68–126)
GLOBULIN PLAS-MCNC: 3.6 G/DL (ref 2.8–4.4)
GLUCOSE BLD-MCNC: 102 MG/DL (ref 70–99)
HBA1C MFR BLD HPLC: 6.4 % (ref ?–5.7)
HDLC SERPL-MCNC: 40 MG/DL (ref 40–59)
LDLC SERPL CALC-MCNC: 192 MG/DL (ref ?–100)
M PROTEIN MFR SERPL ELPH: 7.6 G/DL (ref 6.4–8.2)
NONHDLC SERPL-MCNC: 242 MG/DL (ref ?–130)
OSMOLALITY SERPL CALC.SUM OF ELEC: 290 MOSM/KG (ref 275–295)
PATIENT FASTING Y/N/NP: YES
PATIENT FASTING Y/N/NP: YES
POTASSIUM SERPL-SCNC: 4.1 MMOL/L (ref 3.5–5.1)
SODIUM SERPL-SCNC: 139 MMOL/L (ref 136–145)
TRIGL SERPL-MCNC: 258 MG/DL (ref 30–149)
VLDLC SERPL CALC-MCNC: 55 MG/DL (ref 0–30)

## 2021-07-30 PROCEDURE — 99214 OFFICE O/P EST MOD 30 MIN: CPT | Performed by: INTERNAL MEDICINE

## 2021-07-30 PROCEDURE — 3079F DIAST BP 80-89 MM HG: CPT | Performed by: INTERNAL MEDICINE

## 2021-07-30 PROCEDURE — 83883 ASSAY NEPHELOMETRY NOT SPEC: CPT

## 2021-07-30 PROCEDURE — 80053 COMPREHEN METABOLIC PANEL: CPT

## 2021-07-30 PROCEDURE — 80061 LIPID PANEL: CPT

## 2021-07-30 PROCEDURE — 3077F SYST BP >= 140 MM HG: CPT | Performed by: INTERNAL MEDICINE

## 2021-07-30 PROCEDURE — 3008F BODY MASS INDEX DOCD: CPT | Performed by: INTERNAL MEDICINE

## 2021-07-30 PROCEDURE — 36415 COLL VENOUS BLD VENIPUNCTURE: CPT

## 2021-07-30 RX ORDER — SIMVASTATIN 40 MG
40 TABLET ORAL NIGHTLY
Qty: 30 TABLET | Refills: 3 | Status: SHIPPED | OUTPATIENT
Start: 2021-07-30

## 2021-07-30 NOTE — PROGRESS NOTES
HPI:    Patient ID: Barbara Jama is a 59year old female. Tried zetia and statins. But could not tolerate. Muscle aches with statins. Hypertension  Patient is here for follow up of hypertension. BP at home: 110-120/80's.    Has been compliant w 07/30/2021 08:46 AM    TRIG 258 (H) 07/30/2021 08:46 AM     (H) 07/30/2021 08:46 AM    NONHDLC 242 (H) 07/30/2021 08:46 AM          HPI      Review of Systems   Constitutional: Negative for activity change, appetite change, chills, diaphoresis, fati Procedure Laterality Date   • REMOVE UTERUS AFTER         Family History   Problem Relation Age of Onset   • High Blood Pressure Mother    • Diabetes Mother    • High Cholesterol Mother    • Heart Attack Mother    • Cancer Father         Pros distress or retractions. She is not intubated. Breath sounds: Normal breath sounds and air entry. No stridor, decreased air movement or transmitted upper airway sounds. No decreased breath sounds, wheezing, rhonchi or rales.    Chest:      Chest wall:

## 2021-07-30 NOTE — PATIENT INSTRUCTIONS
ASSESSMENT/PLAN:   Goiter  (primary encounter diagnosis) Check blood     Mixed hyperlipidemia Check blood in 3 months. Take zocor 40 mg 3 times a week. Take CoQ10 200 a day. Discussed with patient of side effects and use of these medications.      Thyroid n first sign of shingles is often pain, burning, tingling, or itching on one part of your face or body. You may also feel as if you have the flu, with fever and chills. 2. A red rash with small blisters appears in a few days.  The rash may look as follows: complications during or after the infection comes out:  · Postherpetic neuralgia. This is the most common complication. It's more likely as people age, especially after age 61. It' is nerve pain at the place where the rash used to be.  It can range from mil pox.  The shingles vaccine  Two shingles vaccines are available to help prevent shingles or make it less painful:  · Zoster vaccine live (ZVL)  · Recombinant zoster vaccine (Carmen Rendon). This is a newer vaccine that has been available since 2017.   You should get receiving this medicine?   Side effects that you should report to your doctor or health care professional as soon as possible:  · allergic reactions like skin rash, itching or hives, swelling of the face, lips, or tongue  · breathing problems  Side effects

## 2021-07-31 NOTE — TELEPHONE ENCOUNTER
From: Edward Headley  To: Shanell Travis. Huber De MD  Sent: 7/30/2021 5:02 PM CDT  Subject: Other    Dr. Huber De,  I think I have attached the colonoscopy pathology report.     Mick Cohen

## 2021-08-02 LAB
KAPPA LC FREE SER-MCNC: 1.85 MG/DL (ref 0.33–1.94)
KAPPA LC FREE/LAMBDA FREE SER NEPH: 1.64 {RATIO} (ref 0.26–1.65)
LAMBDA LC FREE SERPL-MCNC: 1.13 MG/DL (ref 0.57–2.63)

## 2021-08-06 ENCOUNTER — NURSE ONLY (OUTPATIENT)
Dept: INTERNAL MEDICINE CLINIC | Facility: CLINIC | Age: 61
End: 2021-08-06
Payer: COMMERCIAL

## 2021-08-06 ENCOUNTER — TELEPHONE (OUTPATIENT)
Dept: INTERNAL MEDICINE CLINIC | Facility: CLINIC | Age: 61
End: 2021-08-06

## 2021-08-06 VITALS — DIASTOLIC BLOOD PRESSURE: 84 MMHG | SYSTOLIC BLOOD PRESSURE: 148 MMHG | HEART RATE: 85 BPM

## 2021-08-06 DIAGNOSIS — Z01.30 BP CHECK: Primary | ICD-10-CM

## 2021-08-06 PROCEDURE — 3079F DIAST BP 80-89 MM HG: CPT | Performed by: INTERNAL MEDICINE

## 2021-08-06 PROCEDURE — 3077F SYST BP >= 140 MM HG: CPT | Performed by: INTERNAL MEDICINE

## 2021-08-06 RX ORDER — AMPICILLIN TRIHYDRATE 250 MG
CAPSULE ORAL
COMMUNITY
Start: 2021-08-02

## 2021-08-06 RX ORDER — METOPROLOL SUCCINATE 50 MG/1
TABLET, EXTENDED RELEASE ORAL
Qty: 180 TABLET | Refills: 0 | Status: SHIPPED | OUTPATIENT
Start: 2021-08-06 | End: 2021-08-25

## 2021-08-06 NOTE — TELEPHONE ENCOUNTER
Looking at the blood pressure readings look like they are little high. Not severely hyper goes last time 130 on top and less than eighty on the bottom.  We could try going up on the metoprolol just 1 in the morning of 50 mg in the half in the evening of twe

## 2021-08-09 NOTE — TELEPHONE ENCOUNTER
Spoke with patient about Dr Logan Shannon message bellow and instruction of the metoprolol per Dr Ro Stewart 1 tab (50mg) in the morning and half (25mg) of tab at night. Patient verbalized understanding.

## 2021-08-25 ENCOUNTER — PATIENT MESSAGE (OUTPATIENT)
Dept: INTERNAL MEDICINE CLINIC | Facility: CLINIC | Age: 61
End: 2021-08-25

## 2021-08-25 RX ORDER — METOPROLOL SUCCINATE 50 MG/1
TABLET, EXTENDED RELEASE ORAL
Qty: 180 TABLET | Refills: 1 | Status: SHIPPED | OUTPATIENT
Start: 2021-08-25

## 2021-08-25 NOTE — TELEPHONE ENCOUNTER
From: Kian Hall  To: Georges Hess. Kayla Candelaria MD  Sent: 8/25/2021 12:08 PM CDT  Subject: Non-Urgent Medical Question    Dr Kayla Candelaria,    Below are my readings for my blood pressure. I have been taking 50mg metroprolol in the morning and 1/2 (25mg) at night.

## 2021-09-02 PROBLEM — Z12.31 BREAST CANCER SCREENING BY MAMMOGRAM: Status: ACTIVE | Noted: 2021-09-02

## 2021-09-03 ENCOUNTER — OFFICE VISIT (OUTPATIENT)
Dept: INTERNAL MEDICINE CLINIC | Facility: CLINIC | Age: 61
End: 2021-09-03
Payer: COMMERCIAL

## 2021-09-03 VITALS
DIASTOLIC BLOOD PRESSURE: 77 MMHG | WEIGHT: 209 LBS | HEART RATE: 88 BPM | TEMPERATURE: 98 F | SYSTOLIC BLOOD PRESSURE: 134 MMHG | BODY MASS INDEX: 33 KG/M2 | OXYGEN SATURATION: 98 %

## 2021-09-03 DIAGNOSIS — R07.89 CHEST TIGHTNESS: ICD-10-CM

## 2021-09-03 DIAGNOSIS — E04.1 THYROID NODULE: ICD-10-CM

## 2021-09-03 DIAGNOSIS — Z12.31 BREAST CANCER SCREENING BY MAMMOGRAM: ICD-10-CM

## 2021-09-03 DIAGNOSIS — R73.9 HYPERGLYCEMIA: ICD-10-CM

## 2021-09-03 DIAGNOSIS — E83.52 SERUM CALCIUM ELEVATED: ICD-10-CM

## 2021-09-03 DIAGNOSIS — Z71.85 VACCINE COUNSELING: Primary | ICD-10-CM

## 2021-09-03 DIAGNOSIS — Z12.11 COLON CANCER SCREENING: ICD-10-CM

## 2021-09-03 DIAGNOSIS — N28.9 RENAL INSUFFICIENCY: ICD-10-CM

## 2021-09-03 DIAGNOSIS — E78.2 MIXED HYPERLIPIDEMIA: ICD-10-CM

## 2021-09-03 DIAGNOSIS — I10 PRIMARY HYPERTENSION: ICD-10-CM

## 2021-09-03 DIAGNOSIS — Z12.4 PAP SMEAR FOR CERVICAL CANCER SCREENING: ICD-10-CM

## 2021-09-03 LAB
ALBUMIN SERPL-MCNC: 4.1 G/DL (ref 3.4–5)
ALBUMIN/GLOB SERPL: 1.1 {RATIO} (ref 1–2)
ALP LIVER SERPL-CCNC: 75 U/L
ALT SERPL-CCNC: 28 U/L
ANION GAP SERPL CALC-SCNC: 8 MMOL/L (ref 0–18)
AST SERPL-CCNC: 18 U/L (ref 15–37)
BILIRUB SERPL-MCNC: 0.5 MG/DL (ref 0.1–2)
BUN BLD-MCNC: 19 MG/DL (ref 7–18)
BUN/CREAT SERPL: 18.4 (ref 10–20)
CALCIUM BLD-MCNC: 9.2 MG/DL (ref 8.5–10.1)
CHLORIDE SERPL-SCNC: 107 MMOL/L (ref 98–112)
CHOLEST SMN-MCNC: 253 MG/DL (ref ?–200)
CO2 SERPL-SCNC: 25 MMOL/L (ref 21–32)
CREAT BLD-MCNC: 1.03 MG/DL
GLOBULIN PLAS-MCNC: 3.6 G/DL (ref 2.8–4.4)
GLUCOSE BLD-MCNC: 91 MG/DL (ref 70–99)
HDLC SERPL-MCNC: 43 MG/DL (ref 40–59)
LDLC SERPL CALC-MCNC: 145 MG/DL (ref ?–100)
M PROTEIN MFR SERPL ELPH: 7.7 G/DL (ref 6.4–8.2)
NONHDLC SERPL-MCNC: 210 MG/DL (ref ?–130)
OSMOLALITY SERPL CALC.SUM OF ELEC: 292 MOSM/KG (ref 275–295)
PATIENT FASTING Y/N/NP: YES
PATIENT FASTING Y/N/NP: YES
POTASSIUM SERPL-SCNC: 4.2 MMOL/L (ref 3.5–5.1)
SODIUM SERPL-SCNC: 140 MMOL/L (ref 136–145)
TRIGL SERPL-MCNC: 353 MG/DL (ref 30–149)
VLDLC SERPL CALC-MCNC: 68 MG/DL (ref 0–30)

## 2021-09-03 PROCEDURE — 3078F DIAST BP <80 MM HG: CPT | Performed by: INTERNAL MEDICINE

## 2021-09-03 PROCEDURE — 36415 COLL VENOUS BLD VENIPUNCTURE: CPT | Performed by: INTERNAL MEDICINE

## 2021-09-03 PROCEDURE — 99214 OFFICE O/P EST MOD 30 MIN: CPT | Performed by: INTERNAL MEDICINE

## 2021-09-03 PROCEDURE — 3075F SYST BP GE 130 - 139MM HG: CPT | Performed by: INTERNAL MEDICINE

## 2021-09-03 PROCEDURE — 93000 ELECTROCARDIOGRAM COMPLETE: CPT | Performed by: INTERNAL MEDICINE

## 2021-09-03 NOTE — PROGRESS NOTES
HPI:    Patient ID: Saul Oakley is a 59year old female. Hypertension  Patient is here for follow up of hypertension. BP at home:  120/80's. Has been compliant with medications. Exercise level: exercises 7 times a  week (water aerobics X 1 hr. ) 08:46 AM    TSH 1.450 01/13/2021 11:20 AM        Lab Results   Component Value Date/Time    CHOLEST 282 (H) 07/30/2021 08:46 AM    HDL 40 07/30/2021 08:46 AM    TRIG 258 (H) 07/30/2021 08:46 AM     (H) 07/30/2021 08:46 AM    NONHDLC 242 (H) 07/30/20 • Multiple Vitamins-Iron (ONCE DAILY/IRON) Oral Tab Take by mouth. • Cholecalciferol (VITAMIN D) 50 MCG (2000 UT) Oral Cap Take by mouth.      • NON FORMULARY cbd oil     • NON FORMULARY reacta     • Multiple Vitamins-Minerals (OCUVITE ADULT 50+ OR) 206 lb (93.4 kg)      Physical Exam  Vitals and nursing note reviewed. Constitutional:       General: She is not in acute distress. Appearance: Normal appearance. She is well-developed. She is not ill-appearing, toxic-appearing or diaphoretic.       I lower leg: No edema. Skin:     General: Skin is warm and dry. Neurological:      Mental Status: She is alert and oriented to person, place, and time. Psychiatric:         Behavior: Behavior normal. Behavior is cooperative. Thought Content:  Azalia Carr

## 2021-09-03 NOTE — PATIENT INSTRUCTIONS
ASSESSMENT/PLAN:   Vaccine counseling  (primary encounter diagnosis) Shingrix #1 done. Thyroid nodule    Serum calcium elevated Check blood. Primary hypertension Much improved. Careful with diet and excercise at least 30 minutes 3-4 times a week. your immune system, such as HIV/AIDS  · Have cancer, especially Hodgkin disease or lymphoma  · Take medicines that weaken your immune system  What are the symptoms of shingles? 1.  The first sign of shingles is often pain, burning, tingling, or itching on pain is severe, your provider may prescribe stronger pain medicines. What are possible complications of shingles? Shingles often goes away with no lasting effects.  But some people have complications during or after the infection comes out:  · Postherpeti with weak immune systems (such as people getting chemotherapy for cancer, people who have had organ transplants, or people with HIV infections), particularly if they have never had chicken pox.   The shingles vaccine  Two shingles vaccines are available to health care professional.  Talk to your pediatrician regarding the use of this medicine in children. This medicine is not approved for use in children. What side effects may I notice from receiving this medicine?   Side effects that you should report to yo pharmacist, or health care provider.  Copyright© 2020 [employer]

## 2021-11-30 ENCOUNTER — HOSPITAL ENCOUNTER (OUTPATIENT)
Dept: MAMMOGRAPHY | Facility: HOSPITAL | Age: 61
Discharge: HOME | End: 2021-11-30
Attending: INTERNAL MEDICINE
Payer: COMMERCIAL

## 2021-11-30 DIAGNOSIS — Z12.31 BREAST CANCER SCREENING BY MAMMOGRAM: ICD-10-CM

## 2021-11-30 PROCEDURE — 77067 SCR MAMMO BI INCL CAD: CPT | Performed by: INTERNAL MEDICINE

## 2021-11-30 PROCEDURE — 77063 BREAST TOMOSYNTHESIS BI: CPT | Performed by: INTERNAL MEDICINE

## 2022-01-21 ENCOUNTER — PATIENT MESSAGE (OUTPATIENT)
Dept: INTERNAL MEDICINE CLINIC | Facility: CLINIC | Age: 62
End: 2022-01-21

## 2022-01-21 ENCOUNTER — TELEPHONE (OUTPATIENT)
Dept: INTERNAL MEDICINE CLINIC | Facility: CLINIC | Age: 62
End: 2022-01-21

## 2022-01-21 DIAGNOSIS — J02.9 SORE THROAT: Primary | ICD-10-CM

## 2022-01-21 NOTE — TELEPHONE ENCOUNTER
Those home COVID testings are not accurate. Usually what we have people do is do COVID testing through the hospital or through another facility and get the reports back to us.   So we will need to either change that appointment to a virtual or will have to

## 2022-01-21 NOTE — TELEPHONE ENCOUNTER
From: Dasha Reveles  To: Gildardo Tejada MD  Sent: 1/21/2022 1:09 PM CST  Subject: Jan 22 appt    I woke up this morning a little under the weather. Have a scratchy throat, runny nose and eyes. I dont have a cough but clearing my throat alot.  I have

## 2022-01-21 NOTE — TELEPHONE ENCOUNTER
Patient states she just got back from [id number] [facility] on Wednesday and reports she woke up today with a scratchy throat, runny nose/eyes, and the need to clear throat often. Denies fever and all other symptoms. Took home Covid test today which was negative.     Barbie

## 2022-01-21 NOTE — TELEPHONE ENCOUNTER
Spoke with patient she will reschedule  Her appointment because she has other stuff to discuss in person. There is nothing available to see you sooner. Informed her about your message and sent it to her thru my chart.

## 2022-01-22 ENCOUNTER — NURSE ONLY (OUTPATIENT)
Dept: LAB | Facility: HOSPITAL | Age: 62
End: 2022-01-22
Attending: INTERNAL MEDICINE
Payer: COMMERCIAL

## 2022-01-22 DIAGNOSIS — J02.9 SORE THROAT: ICD-10-CM

## 2022-01-22 NOTE — TELEPHONE ENCOUNTER
Can do 4 PM 2-11-22 at [facility] which will be physical and ? Pap ? and follow up.

## 2022-01-25 ENCOUNTER — TELEPHONE (OUTPATIENT)
Dept: INTERNAL MEDICINE CLINIC | Facility: CLINIC | Age: 62
End: 2022-01-25

## 2022-01-25 LAB — SARS-COV-2 RNA RESP QL NAA+PROBE: DETECTED

## 2022-01-25 NOTE — TELEPHONE ENCOUNTER
Verified name and  of patient. See result note of 22. Called patient back to follow up and patient is feeling better. Provided the patient care experience number to her. Reviewed with patient home care advice for covid symptoms.  Patient verbalized

## 2022-01-25 NOTE — TELEPHONE ENCOUNTER
----- Message from Sofia Fritz MD sent at 1/25/2022 11:14 AM CST -----  Covid testing is positive.   The patient/caregiver has been given the \"Fact Sheet for Patients, Parents and Caregivers\", informed of alternatives to receiving authorized Regen-

## 2022-01-27 ENCOUNTER — PATIENT MESSAGE (OUTPATIENT)
Dept: INTERNAL MEDICINE CLINIC | Facility: CLINIC | Age: 62
End: 2022-01-27

## 2022-01-27 ENCOUNTER — NURSE TRIAGE (OUTPATIENT)
Dept: INTERNAL MEDICINE CLINIC | Facility: CLINIC | Age: 62
End: 2022-01-27

## 2022-01-27 ENCOUNTER — TELEMEDICINE (OUTPATIENT)
Dept: INTERNAL MEDICINE CLINIC | Facility: CLINIC | Age: 62
End: 2022-01-27

## 2022-01-27 DIAGNOSIS — R05.9 COUGH: ICD-10-CM

## 2022-01-27 DIAGNOSIS — U07.1 COVID-19 VIRUS INFECTION: Primary | ICD-10-CM

## 2022-01-27 PROCEDURE — 99213 OFFICE O/P EST LOW 20 MIN: CPT | Performed by: INTERNAL MEDICINE

## 2022-01-27 RX ORDER — BENZONATATE 100 MG/1
100 CAPSULE ORAL 3 TIMES DAILY PRN
Qty: 20 CAPSULE | Refills: 0 | Status: SHIPPED | OUTPATIENT
Start: 2022-01-27 | End: 2022-02-03

## 2022-01-27 NOTE — TELEPHONE ENCOUNTER
Vera Reno RN 1/27/2022 11:53 AM CST        ----- Message -----  From: Jane Osorio  Sent: 1/27/2022 11:23 AM CST  To: Em Rn Triage  Subject: Cough medicine     I am requesting a cough medicine with codeine to help me with the coughing episodes

## 2022-01-27 NOTE — PROGRESS NOTES
Subjective:   Patient ID: Tonya Coronel is a 59year old female. HPI    Patient seen through live two way video visit.  She was diagnosed with Covid January 22 overall she is doing okay fever is gone last few days only complaint she has started now Coughing  Oxytetracycline         RASH    Objective:   Physical Exam  Constitutional:       Appearance: She is not ill-appearing. Pulmonary:      Effort: No respiratory distress.    Neurological:      Mental Status: She is oriented to person, place,

## 2022-01-27 NOTE — TELEPHONE ENCOUNTER
Pt stated that she has covid and she has been having a coughing spells and a stuffy nose. Pt will like something for her cough. Pt was given an video appt with Dr. Mikal Melgar for today.       Future Appointments   Date Time Provider Slade Stern   1/27/2022

## 2022-01-31 LAB — AMB EXT COVID-19 RESULT: NOT DETECTED

## 2022-02-01 ENCOUNTER — PATIENT MESSAGE (OUTPATIENT)
Dept: INTERNAL MEDICINE CLINIC | Facility: CLINIC | Age: 62
End: 2022-02-01

## 2022-02-11 ENCOUNTER — OFFICE VISIT (OUTPATIENT)
Dept: INTERNAL MEDICINE CLINIC | Facility: CLINIC | Age: 62
End: 2022-02-11
Payer: COMMERCIAL

## 2022-02-11 VITALS
HEART RATE: 90 BPM | BODY MASS INDEX: 31.49 KG/M2 | WEIGHT: 210.19 LBS | TEMPERATURE: 98 F | SYSTOLIC BLOOD PRESSURE: 159 MMHG | OXYGEN SATURATION: 98 % | DIASTOLIC BLOOD PRESSURE: 84 MMHG | HEIGHT: 68.4 IN

## 2022-02-11 DIAGNOSIS — Z12.31 BREAST CANCER SCREENING BY MAMMOGRAM: ICD-10-CM

## 2022-02-11 DIAGNOSIS — E78.2 MIXED HYPERLIPIDEMIA: ICD-10-CM

## 2022-02-11 DIAGNOSIS — R05.9 COUGH: ICD-10-CM

## 2022-02-11 DIAGNOSIS — M25.512 CHRONIC PAIN OF BOTH SHOULDERS: ICD-10-CM

## 2022-02-11 DIAGNOSIS — Z71.85 VACCINE COUNSELING: ICD-10-CM

## 2022-02-11 DIAGNOSIS — Z12.4 PAP SMEAR FOR CERVICAL CANCER SCREENING: ICD-10-CM

## 2022-02-11 DIAGNOSIS — I10 PRIMARY HYPERTENSION: Primary | ICD-10-CM

## 2022-02-11 DIAGNOSIS — Z00.00 ADULT GENERAL MEDICAL EXAMINATION: ICD-10-CM

## 2022-02-11 DIAGNOSIS — R23.2 HOT FLASHES: ICD-10-CM

## 2022-02-11 DIAGNOSIS — R73.9 HYPERGLYCEMIA: ICD-10-CM

## 2022-02-11 DIAGNOSIS — M25.511 CHRONIC PAIN OF BOTH SHOULDERS: ICD-10-CM

## 2022-02-11 DIAGNOSIS — Z12.11 COLON CANCER SCREENING: ICD-10-CM

## 2022-02-11 DIAGNOSIS — E83.52 SERUM CALCIUM ELEVATED: ICD-10-CM

## 2022-02-11 DIAGNOSIS — E04.1 THYROID NODULE: ICD-10-CM

## 2022-02-11 DIAGNOSIS — E04.9 GOITER: ICD-10-CM

## 2022-02-11 DIAGNOSIS — G89.29 CHRONIC PAIN OF BOTH SHOULDERS: ICD-10-CM

## 2022-02-11 PROBLEM — R42 POSITIONAL LIGHTHEADEDNESS: Status: RESOLVED | Noted: 2021-02-08 | Resolved: 2022-02-11

## 2022-02-11 LAB
ALBUMIN SERPL-MCNC: 3.8 G/DL (ref 3.4–5)
ALBUMIN/GLOB SERPL: 0.9 {RATIO} (ref 1–2)
ALP LIVER SERPL-CCNC: 95 U/L
ALT SERPL-CCNC: 30 U/L
ANION GAP SERPL CALC-SCNC: 6 MMOL/L (ref 0–18)
APPEARANCE: CLEAR
AST SERPL-CCNC: 16 U/L (ref 15–37)
BASOPHILS # BLD AUTO: 0.06 X10(3) UL (ref 0–0.2)
BASOPHILS NFR BLD AUTO: 0.7 %
BILIRUB SERPL-MCNC: 0.3 MG/DL (ref 0.1–2)
BILIRUBIN: NEGATIVE
BUN BLD-MCNC: 19 MG/DL (ref 7–18)
BUN/CREAT SERPL: 19.2 (ref 10–20)
CALCIUM BLD-MCNC: 9.3 MG/DL (ref 8.5–10.1)
CHLORIDE SERPL-SCNC: 108 MMOL/L (ref 98–112)
CHOLEST SERPL-MCNC: 256 MG/DL (ref ?–200)
CO2 SERPL-SCNC: 26 MMOL/L (ref 21–32)
CREAT BLD-MCNC: 0.99 MG/DL
DEPRECATED RDW RBC AUTO: 45.2 FL (ref 35.1–46.3)
EOSINOPHIL # BLD AUTO: 0.35 X10(3) UL (ref 0–0.7)
EOSINOPHIL NFR BLD AUTO: 3.8 %
ERYTHROCYTE [DISTWIDTH] IN BLOOD BY AUTOMATED COUNT: 13.7 % (ref 11–15)
EST. AVERAGE GLUCOSE BLD GHB EST-MCNC: 137 MG/DL (ref 68–126)
FASTING PATIENT LIPID ANSWER: YES
FASTING STATUS PATIENT QL REPORTED: YES
FSH SERPL-ACNC: 44.7 MIU/ML
GLOBULIN PLAS-MCNC: 4.2 G/DL (ref 2.8–4.4)
GLUCOSE (URINE DIPSTICK): NEGATIVE MG/DL
GLUCOSE BLD-MCNC: 103 MG/DL (ref 70–99)
HBA1C MFR BLD: 6.4 % (ref ?–5.7)
HCT VFR BLD AUTO: 39.6 %
HDLC SERPL-MCNC: 36 MG/DL (ref 40–59)
HGB BLD-MCNC: 12.5 G/DL
IMM GRANULOCYTES # BLD AUTO: 0.03 X10(3) UL (ref 0–1)
IMM GRANULOCYTES NFR BLD: 0.3 %
LDLC SERPL CALC-MCNC: 121 MG/DL (ref ?–100)
LEUKOCYTES: NEGATIVE
LH SERPL-ACNC: 23 MIU/ML
LYMPHOCYTES # BLD AUTO: 2.9 X10(3) UL (ref 1–4)
LYMPHOCYTES NFR BLD AUTO: 31.6 %
MCH RBC QN AUTO: 28.4 PG (ref 26–34)
MCHC RBC AUTO-ENTMCNC: 31.6 G/DL (ref 31–37)
MCV RBC AUTO: 90 FL
MONOCYTES # BLD AUTO: 0.72 X10(3) UL (ref 0.1–1)
MONOCYTES NFR BLD AUTO: 7.8 %
MULTISTIX LOT#: NORMAL NUMERIC
NEUTROPHILS # BLD AUTO: 5.12 X10 (3) UL (ref 1.5–7.7)
NEUTROPHILS # BLD AUTO: 5.12 X10(3) UL (ref 1.5–7.7)
NITRITE, URINE: NEGATIVE
NONHDLC SERPL-MCNC: 220 MG/DL (ref ?–130)
OCCULT BLOOD: NEGATIVE
OSMOLALITY SERPL CALC.SUM OF ELEC: 293 MOSM/KG (ref 275–295)
PH, URINE: 5.5 (ref 4.5–8)
PLATELET # BLD AUTO: 346 10(3)UL (ref 150–450)
POTASSIUM SERPL-SCNC: 4.3 MMOL/L (ref 3.5–5.1)
PROT SERPL-MCNC: 8 G/DL (ref 6.4–8.2)
PROTEIN (URINE DIPSTICK): NEGATIVE MG/DL
RBC # BLD AUTO: 4.4 X10(6)UL
SODIUM SERPL-SCNC: 140 MMOL/L (ref 136–145)
SPECIFIC GRAVITY: 1.03 (ref 1–1.03)
TRIGL SERPL-MCNC: 552 MG/DL (ref 30–149)
TSI SER-ACNC: 1.52 MIU/ML (ref 0.36–3.74)
URINE-COLOR: YELLOW
UROBILINOGEN,SEMI-QN: 0.2 MG/DL (ref 0–1.9)
VLDLC SERPL CALC-MCNC: 102 MG/DL (ref 0–30)
WBC # BLD AUTO: 9.2 X10(3) UL (ref 4–11)

## 2022-02-11 PROCEDURE — 81003 URINALYSIS AUTO W/O SCOPE: CPT | Performed by: INTERNAL MEDICINE

## 2022-02-11 PROCEDURE — 3008F BODY MASS INDEX DOCD: CPT | Performed by: INTERNAL MEDICINE

## 2022-02-11 PROCEDURE — 3079F DIAST BP 80-89 MM HG: CPT | Performed by: INTERNAL MEDICINE

## 2022-02-11 PROCEDURE — 99396 PREV VISIT EST AGE 40-64: CPT | Performed by: INTERNAL MEDICINE

## 2022-02-11 PROCEDURE — 99215 OFFICE O/P EST HI 40 MIN: CPT | Performed by: INTERNAL MEDICINE

## 2022-02-11 PROCEDURE — 36415 COLL VENOUS BLD VENIPUNCTURE: CPT | Performed by: INTERNAL MEDICINE

## 2022-02-11 PROCEDURE — 3077F SYST BP >= 140 MM HG: CPT | Performed by: INTERNAL MEDICINE

## 2022-02-11 NOTE — PATIENT INSTRUCTIONS
ASSESSMENT/PLAN:   Primary hypertension  (primary encounter diagnosis) ? Control. Call in 2 weeks. Careful with diet and excercise at least 30 minutes 3-4 times a week. Check blood pressures at different times on different days. Can purchase own blood pressure monitor. If not, check at local pharmacy. Bake foods more and grill occasionally. Avoid fried foods. No salt. Use other seasonings. Serum calcium elevated Check blood. Goiter Check blood and US. Mixed hyperlipidemia Check blood. Thyroid nodule Check US and blood. Vaccine counselingUp to date. Colon cancer screening FU GI.     Breast cancer screening by mammogram Checkl mammogram 11-30-22. Continue self breast exam every month. Hyperglycemia Check blood. Adult general medical examination Check urine. Pap smear for cervical cancer screening FU Gyne. Hot flashes Check blood. Chronic pain of both shoulders Resolved. Cough Improving. Hold meds. Orders Placed This Encounter      Lipid Panel      Comp Metabolic Panel (14)      CBC With Differential With Platelet      TSH W Reflex To Free T4      Hemoglobin A1C      URINALYSIS, AUTO, W/O SCOPE      Testosterone,Free And Total      LH (Luteinizing Hormone)      FSH      Estrogens Fractionated, S [E]      Pre-Procedure Covid-19 Testing by PCR (Dana)      Meds This Visit:  Requested Prescriptions      No prescriptions requested or ordered in this encounter       Imaging & Referrals:  CONSULTATION ONLY, OBG (INTERNAL)  OBG - EXTERNAL  EVALUATE & TREAT, OBG (INTERNAL)  EVALUATE & TREAT, GASTRO (INTERNAL)  US THYROID (CPT=76536)  ERIK VINNY 2D+3D SCREENING BILAT (CPT=77067/26596)  CARD ECHO STRESS ECHO/REST AND STRESS(CPT=93350/91647 DMG 09793)     RTC in 3 months for FU HTN.

## 2022-02-12 ENCOUNTER — TELEPHONE (OUTPATIENT)
Dept: INTERNAL MEDICINE CLINIC | Facility: CLINIC | Age: 62
End: 2022-02-12

## 2022-02-12 NOTE — TELEPHONE ENCOUNTER
----- Message from Harini Witt RN sent at 2/12/2022 11:07 AM CST -----  Regarding: FW: High Triglycerides      ----- Message -----  From: Nomi Carrion  Sent: 2/12/2022   9:56 AM CST  To: [facility] Rn Triage  Subject: High Triglycerides                               I saw that my triglycerides are still very high. Currently I am taking the Simvistatin 3 times a week. Should we try to up the dosage?       Dayana Loya

## 2022-02-12 NOTE — PROGRESS NOTES
CBC Normal (white blood cells and red blood cells and platelets),   CMP Normal (electrolytes, kidney and liver functions),   Lipid (choilesterol) is very elevated. Goal triglycerides less than 150. Goal LDL less than 100. Careful with diet. Avoid red meat, shellfish, pork. Try not to boland foods. Lower carb diet. Exercise is most part at least 30 minutes 3-4 times a week sustained cardiovascular aerobic exercise getting that heart rate going and keeping it going for 30 minutes at a time. If cannot do 30 will start with 10 minutes of brisk walking is good at each week build up 5 minutes more. Recheck lipid in 3 months. If taking simvastatin 40. Would change to Crestor 20 mg at night. And would add fenofibrate 140 5 at night to help with triglycerides. Thyroid is good. FSH and LH hormones consistent with menopause. Urinalysis looks okay. Hemoglobin A1c which is a 3-month average of sugars is good. But borderline. Goal is 6.5 or less. There is a 20% risk of developing diabetes. Lower carb diet. Recheck in 6 months.

## 2022-02-14 NOTE — TELEPHONE ENCOUNTER
Spoke to pt. Pt taking Simvastatin 3 times a week d/t hx of myalgia w/ statin. Pt unsure which statin. Discussed starting atorvastatin 3 times week. Per pt hold on switching. Will start simvastatin daily and monitor for myalgia. Not exercising since 11/2021. Hx of covid19. Diet same. Discussed Careful with diet and excercise at least 30 minutes 3-4 times a week. Pt understands and agreed.

## 2022-03-01 ENCOUNTER — HOSPITAL ENCOUNTER (OUTPATIENT)
Dept: ULTRASOUND IMAGING | Facility: HOSPITAL | Age: 62
Discharge: HOME | End: 2022-03-01
Attending: INTERNAL MEDICINE
Payer: COMMERCIAL

## 2022-03-01 DIAGNOSIS — E04.9 GOITER: ICD-10-CM

## 2022-03-01 DIAGNOSIS — Z12.4 PAP SMEAR FOR CERVICAL CANCER SCREENING: ICD-10-CM

## 2022-03-01 PROCEDURE — 76536 US EXAM OF HEAD AND NECK: CPT | Performed by: INTERNAL MEDICINE

## 2022-03-07 ENCOUNTER — ORDER TRANSCRIPTION (OUTPATIENT)
Dept: ADMINISTRATIVE | Facility: HOSPITAL | Age: 62
End: 2022-03-07

## 2022-03-12 ENCOUNTER — LAB ENCOUNTER (OUTPATIENT)
Dept: LAB | Facility: HOSPITAL | Age: 62
End: 2022-03-12
Attending: INTERNAL MEDICINE
Payer: COMMERCIAL

## 2022-03-12 DIAGNOSIS — M25.511 CHRONIC PAIN OF BOTH SHOULDERS: ICD-10-CM

## 2022-03-12 DIAGNOSIS — G89.29 CHRONIC PAIN OF BOTH SHOULDERS: ICD-10-CM

## 2022-03-12 DIAGNOSIS — M25.512 CHRONIC PAIN OF BOTH SHOULDERS: ICD-10-CM

## 2022-03-13 LAB — SARS-COV-2 RNA RESP QL NAA+PROBE: NOT DETECTED

## 2022-03-15 ENCOUNTER — HOSPITAL ENCOUNTER (OUTPATIENT)
Dept: CV DIAGNOSTICS | Facility: HOSPITAL | Age: 62
Discharge: HOME | End: 2022-03-15
Attending: INTERNAL MEDICINE
Payer: COMMERCIAL

## 2022-03-15 DIAGNOSIS — M25.511 CHRONIC PAIN OF BOTH SHOULDERS: ICD-10-CM

## 2022-03-15 DIAGNOSIS — M25.512 CHRONIC PAIN OF BOTH SHOULDERS: ICD-10-CM

## 2022-03-15 DIAGNOSIS — G89.29 CHRONIC PAIN OF BOTH SHOULDERS: ICD-10-CM

## 2022-03-15 PROCEDURE — 93350 STRESS TTE ONLY: CPT | Performed by: INTERNAL MEDICINE

## 2022-03-15 PROCEDURE — 93018 CV STRESS TEST I&R ONLY: CPT | Performed by: INTERNAL MEDICINE

## 2022-03-15 PROCEDURE — 93017 CV STRESS TEST TRACING ONLY: CPT | Performed by: INTERNAL MEDICINE

## 2022-03-25 ENCOUNTER — HOSPITAL ENCOUNTER (INPATIENT)
Facility: HOSPITAL | Age: 62
LOS: 3 days | Discharge: HOME | End: 2022-03-28
Attending: EMERGENCY MEDICINE | Admitting: INTERNAL MEDICINE
Payer: COMMERCIAL

## 2022-03-25 ENCOUNTER — APPOINTMENT (OUTPATIENT)
Dept: CT IMAGING | Facility: HOSPITAL | Age: 62
End: 2022-03-25
Attending: EMERGENCY MEDICINE
Payer: COMMERCIAL

## 2022-03-25 ENCOUNTER — PATIENT MESSAGE (OUTPATIENT)
Dept: INTERNAL MEDICINE CLINIC | Facility: CLINIC | Age: 62
End: 2022-03-25

## 2022-03-25 DIAGNOSIS — K57.92 ACUTE DIVERTICULITIS: Primary | ICD-10-CM

## 2022-03-25 LAB
ANION GAP SERPL CALC-SCNC: 7 MMOL/L (ref 0–18)
BASOPHILS # BLD AUTO: 0.04 X10(3) UL (ref 0–0.2)
BASOPHILS NFR BLD AUTO: 0.3 %
BILIRUB UR QL: NEGATIVE
BUN BLD-MCNC: 13 MG/DL (ref 7–18)
BUN/CREAT SERPL: 12 (ref 10–20)
CALCIUM BLD-MCNC: 9.3 MG/DL (ref 8.5–10.1)
CHLORIDE SERPL-SCNC: 104 MMOL/L (ref 98–112)
CO2 SERPL-SCNC: 26 MMOL/L (ref 21–32)
COLOR UR: YELLOW
CREAT BLD-MCNC: 1.08 MG/DL
DEPRECATED RDW RBC AUTO: 46.2 FL (ref 35.1–46.3)
EOSINOPHIL # BLD AUTO: 0.07 X10(3) UL (ref 0–0.7)
EOSINOPHIL NFR BLD AUTO: 0.5 %
ERYTHROCYTE [DISTWIDTH] IN BLOOD BY AUTOMATED COUNT: 14 % (ref 11–15)
GLUCOSE BLD-MCNC: 119 MG/DL (ref 70–99)
GLUCOSE UR-MCNC: NEGATIVE MG/DL
HCT VFR BLD AUTO: 38.8 %
HGB BLD-MCNC: 12.4 G/DL
IMM GRANULOCYTES # BLD AUTO: 0.08 X10(3) UL (ref 0–1)
IMM GRANULOCYTES NFR BLD: 0.5 %
KETONES UR-MCNC: NEGATIVE MG/DL
LYMPHOCYTES # BLD AUTO: 1.88 X10(3) UL (ref 1–4)
LYMPHOCYTES NFR BLD AUTO: 12.2 %
MCH RBC QN AUTO: 28.8 PG (ref 26–34)
MCHC RBC AUTO-ENTMCNC: 32 G/DL (ref 31–37)
MCV RBC AUTO: 90.2 FL
MONOCYTES # BLD AUTO: 1.4 X10(3) UL (ref 0.1–1)
MONOCYTES NFR BLD AUTO: 9.1 %
NEUTROPHILS # BLD AUTO: 11.99 X10 (3) UL (ref 1.5–7.7)
NEUTROPHILS # BLD AUTO: 11.99 X10(3) UL (ref 1.5–7.7)
NEUTROPHILS NFR BLD AUTO: 77.4 %
NITRITE UR QL STRIP.AUTO: NEGATIVE
OSMOLALITY SERPL CALC.SUM OF ELEC: 285 MOSM/KG (ref 275–295)
PH UR: 6 [PH] (ref 5–8)
PLATELET # BLD AUTO: 356 10(3)UL (ref 150–450)
POTASSIUM SERPL-SCNC: 3.9 MMOL/L (ref 3.5–5.1)
PROT UR-MCNC: NEGATIVE MG/DL
RBC # BLD AUTO: 4.3 X10(6)UL
SARS-COV-2 RNA RESP QL NAA+PROBE: NOT DETECTED
SODIUM SERPL-SCNC: 137 MMOL/L (ref 136–145)
SP GR UR STRIP: 1.01 (ref 1–1.03)
UROBILINOGEN UR STRIP-ACNC: <2
VIT C UR-MCNC: NEGATIVE MG/DL
WBC # BLD AUTO: 15.5 X10(3) UL (ref 4–11)

## 2022-03-25 PROCEDURE — 81001 URINALYSIS AUTO W/SCOPE: CPT | Performed by: EMERGENCY MEDICINE

## 2022-03-25 PROCEDURE — 85025 COMPLETE CBC W/AUTO DIFF WBC: CPT | Performed by: EMERGENCY MEDICINE

## 2022-03-25 PROCEDURE — 87086 URINE CULTURE/COLONY COUNT: CPT | Performed by: EMERGENCY MEDICINE

## 2022-03-25 PROCEDURE — 74177 CT ABD & PELVIS W/CONTRAST: CPT | Performed by: EMERGENCY MEDICINE

## 2022-03-25 PROCEDURE — 99285 EMERGENCY DEPT VISIT HI MDM: CPT

## 2022-03-25 PROCEDURE — 96365 THER/PROPH/DIAG IV INF INIT: CPT

## 2022-03-25 PROCEDURE — 80048 BASIC METABOLIC PNL TOTAL CA: CPT | Performed by: EMERGENCY MEDICINE

## 2022-03-25 PROCEDURE — 93010 ELECTROCARDIOGRAM REPORT: CPT | Performed by: EMERGENCY MEDICINE

## 2022-03-25 PROCEDURE — 93005 ELECTROCARDIOGRAM TRACING: CPT

## 2022-03-25 PROCEDURE — 96375 TX/PRO/DX INJ NEW DRUG ADDON: CPT

## 2022-03-25 PROCEDURE — 96361 HYDRATE IV INFUSION ADD-ON: CPT

## 2022-03-25 RX ORDER — ONDANSETRON 2 MG/ML
4 INJECTION INTRAMUSCULAR; INTRAVENOUS ONCE
Status: COMPLETED | OUTPATIENT
Start: 2022-03-25 | End: 2022-03-25

## 2022-03-25 RX ORDER — MORPHINE SULFATE 2 MG/ML
2 INJECTION, SOLUTION INTRAMUSCULAR; INTRAVENOUS ONCE
Status: COMPLETED | OUTPATIENT
Start: 2022-03-25 | End: 2022-03-25

## 2022-03-26 PROBLEM — I10 HYPERTENSION: Status: ACTIVE | Noted: 2020-12-21

## 2022-03-26 LAB
ANION GAP SERPL CALC-SCNC: 7 MMOL/L (ref 0–18)
BASOPHILS # BLD AUTO: 0.03 X10(3) UL (ref 0–0.2)
BASOPHILS NFR BLD AUTO: 0.3 %
BUN BLD-MCNC: 13 MG/DL (ref 7–18)
BUN/CREAT SERPL: 11.3 (ref 10–20)
CALCIUM BLD-MCNC: 8.6 MG/DL (ref 8.5–10.1)
CHLORIDE SERPL-SCNC: 107 MMOL/L (ref 98–112)
CO2 SERPL-SCNC: 25 MMOL/L (ref 21–32)
CREAT BLD-MCNC: 1.15 MG/DL
DEPRECATED RDW RBC AUTO: 46.3 FL (ref 35.1–46.3)
EOSINOPHIL # BLD AUTO: 0.14 X10(3) UL (ref 0–0.7)
EOSINOPHIL NFR BLD AUTO: 1.2 %
ERYTHROCYTE [DISTWIDTH] IN BLOOD BY AUTOMATED COUNT: 13.7 % (ref 11–15)
GLUCOSE BLD-MCNC: 120 MG/DL (ref 70–99)
HCT VFR BLD AUTO: 36.3 %
HGB BLD-MCNC: 11.3 G/DL
IMM GRANULOCYTES # BLD AUTO: 0.05 X10(3) UL (ref 0–1)
IMM GRANULOCYTES NFR BLD: 0.4 %
LYMPHOCYTES # BLD AUTO: 2.21 X10(3) UL (ref 1–4)
LYMPHOCYTES NFR BLD AUTO: 19.4 %
MCH RBC QN AUTO: 28.4 PG (ref 26–34)
MCHC RBC AUTO-ENTMCNC: 31.1 G/DL (ref 31–37)
MCV RBC AUTO: 91.2 FL
MONOCYTES # BLD AUTO: 1.13 X10(3) UL (ref 0.1–1)
MONOCYTES NFR BLD AUTO: 9.9 %
NEUTROPHILS # BLD AUTO: 7.85 X10 (3) UL (ref 1.5–7.7)
NEUTROPHILS # BLD AUTO: 7.85 X10(3) UL (ref 1.5–7.7)
NEUTROPHILS NFR BLD AUTO: 68.8 %
OSMOLALITY SERPL CALC.SUM OF ELEC: 289 MOSM/KG (ref 275–295)
PLATELET # BLD AUTO: 306 10(3)UL (ref 150–450)
POTASSIUM SERPL-SCNC: 3.9 MMOL/L (ref 3.5–5.1)
RBC # BLD AUTO: 3.98 X10(6)UL
SODIUM SERPL-SCNC: 139 MMOL/L (ref 136–145)
WBC # BLD AUTO: 11.4 X10(3) UL (ref 4–11)

## 2022-03-26 PROCEDURE — C9113 INJ PANTOPRAZOLE SODIUM, VIA: HCPCS | Performed by: INTERNAL MEDICINE

## 2022-03-26 PROCEDURE — 85025 COMPLETE CBC W/AUTO DIFF WBC: CPT | Performed by: INTERNAL MEDICINE

## 2022-03-26 PROCEDURE — 80048 BASIC METABOLIC PNL TOTAL CA: CPT | Performed by: INTERNAL MEDICINE

## 2022-03-26 RX ORDER — ROSUVASTATIN CALCIUM 20 MG/1
20 TABLET, COATED ORAL NIGHTLY
Status: DISCONTINUED | OUTPATIENT
Start: 2022-03-27 | End: 2022-03-28

## 2022-03-26 RX ORDER — ONDANSETRON 2 MG/ML
4 INJECTION INTRAMUSCULAR; INTRAVENOUS EVERY 4 HOURS PRN
Status: DISCONTINUED | OUTPATIENT
Start: 2022-03-26 | End: 2022-03-28

## 2022-03-26 RX ORDER — DEXTROSE AND SODIUM CHLORIDE 5; .45 G/100ML; G/100ML
INJECTION, SOLUTION INTRAVENOUS CONTINUOUS
Status: DISCONTINUED | OUTPATIENT
Start: 2022-03-26 | End: 2022-03-26

## 2022-03-26 RX ORDER — HEPARIN SODIUM 5000 [USP'U]/ML
5000 INJECTION, SOLUTION INTRAVENOUS; SUBCUTANEOUS EVERY 8 HOURS SCHEDULED
Status: DISCONTINUED | OUTPATIENT
Start: 2022-03-26 | End: 2022-03-28

## 2022-03-26 RX ORDER — METOPROLOL SUCCINATE 25 MG/1
25 TABLET, EXTENDED RELEASE ORAL NIGHTLY
Status: DISCONTINUED | OUTPATIENT
Start: 2022-03-26 | End: 2022-03-28

## 2022-03-26 RX ORDER — METOPROLOL SUCCINATE 50 MG/1
50 TABLET, EXTENDED RELEASE ORAL EVERY MORNING
Status: DISCONTINUED | OUTPATIENT
Start: 2022-03-26 | End: 2022-03-28

## 2022-03-26 RX ORDER — MORPHINE SULFATE 2 MG/ML
2 INJECTION, SOLUTION INTRAMUSCULAR; INTRAVENOUS EVERY 4 HOURS PRN
Status: DISCONTINUED | OUTPATIENT
Start: 2022-03-26 | End: 2022-03-28

## 2022-03-26 RX ORDER — ASPIRIN 81 MG/1
81 TABLET ORAL DAILY
Status: DISCONTINUED | OUTPATIENT
Start: 2022-03-27 | End: 2022-03-28

## 2022-03-26 NOTE — ED QUICK NOTES
Orders for admission, patient is aware of plan and ready to go upstairs. Any questions, please call ED RN Rosie at [phone].      Patient Covid vaccination status: Fully vaccinated     COVID Test Ordered in ED: Rapid SARS-CoV-2 by PCR    COVID Suspicion at Admission: N/A    Mental Status/LOC at time of transport: a/o x 4

## 2022-03-26 NOTE — PLAN OF CARE
Problem: Patient Centered Care  Goal: Patient preferences are identified and integrated in the patient's plan of care  Description: Interventions:  - What would you like us to know as we care for you?   - Provide timely, complete, and accurate information to patient/family  - Incorporate patient and family knowledge, values, beliefs, and cultural backgrounds into the planning and delivery of care  - Encourage patient/family to participate in care and decision-making at the level they choose  - Honor patient and family perspectives and choices  Outcome: Progressing     Problem: Patient/Family Goals  Goal: Patient/Family Long Term Goal  Description: Patient's Long Term Goal: Pain resolved! Interventions:  - Pain level is assess using pain scale from 1 to 10.  - Medicated as needed for pain or discomfort. - See additional Care Plan goals for specific interventions  Outcome: Progressing  Goal: Patient/Family Short Term Goal  Description: Patient's Short Term Goal: Diarrhea resolved! Interventions:   - Patient will have formed bowel movement. - Intake and output is monitored.   - See additional Care Plan goals for specific interventions  Outcome: Progressing     Problem: GASTROINTESTINAL - ADULT  Goal: Minimal or absence of nausea and vomiting  Description: INTERVENTIONS:  - Maintain adequate hydration with IV or PO as ordered and tolerated  - Nasogastric tube to low intermittent suction as ordered  - Evaluate effectiveness of ordered antiemetic medications  - Provide nonpharmacologic comfort measures as appropriate  - Advance diet as tolerated, if ordered  - Obtain nutritional consult as needed  - Evaluate fluid balance  Outcome: Progressing  Goal: Maintains or returns to baseline bowel function  Description: INTERVENTIONS:  - Assess bowel function  - Maintain adequate hydration with IV or PO as ordered and tolerated  - Evaluate effectiveness of GI medications  - Encourage mobilization and activity  - Obtain nutritional consult as needed  - Establish a toileting routine/schedule  - Consider collaborating with pharmacy to review patient's medication profile  Outcome: Progressing     Problem: SKIN/TISSUE INTEGRITY - ADULT  Goal: Skin integrity remains intact  Description: INTERVENTIONS  - Assess and document risk factors for pressure ulcer development  - Assess and document skin integrity  - Monitor for areas of redness and/or skin breakdown  - Initiate interventions, skin care algorithm/standards of care as needed  Outcome: Progressing     Problem: PAIN - ADULT  Goal: Verbalizes/displays adequate comfort level or patient's stated pain goal  Description: INTERVENTIONS:  - Encourage pt to monitor pain and request assistance  - Assess pain using appropriate pain scale  - Administer analgesics based on type and severity of pain and evaluate response  - Implement non-pharmacological measures as appropriate and evaluate response  - Consider cultural and social influences on pain and pain management  - Manage/alleviate anxiety  - Utilize distraction and/or relaxation techniques  - Monitor for opioid side effects  - Notify MD/LIP if interventions unsuccessful or patient reports new pain  - Anticipate increased pain with activity and pre-medicate as appropriate  Outcome: Progressing     Problem: SAFETY ADULT - FALL  Goal: Free from fall injury  Description: INTERVENTIONS:  - Assess pt frequently for physical needs  - Identify cognitive and physical deficits and behaviors that affect risk of falls.   - [place] fall precautions as indicated by assessment.  - Educate pt/family on patient safety including physical limitations  - Instruct pt to call for assistance with activity based on assessment  - Modify environment to reduce risk of injury  - Provide assistive devices as appropriate  - Consider OT/PT consult to assist with strengthening/mobility  - Encourage toileting schedule  Outcome: Progressing     Problem: DISCHARGE PLANNING  Goal: Discharge to home or other facility with appropriate resources  Description: INTERVENTIONS:  - Identify barriers to discharge w/pt and caregiver  - Include patient/family/discharge partner in discharge planning  - Arrange for needed discharge resources and transportation as appropriate  - Identify discharge learning needs (meds, wound care, etc)  - Arrange for interpreters to assist at discharge as needed  - Consider post-discharge preferences of patient/family/discharge partner  - Complete POLST form as appropriate  - Assess patient's ability to be responsible for managing their own health  - Refer to Case Management Department for coordinating discharge planning if the patient needs post-hospital services based on physician/LIP order or complex needs related to functional status, cognitive ability or social support system  Outcome: Progressing     Patient is presently resting in the bed. Alert x 4. Patent denied pain or discomfort at current time. Vital signs taken and stable. Self care and independent. No noted diarrhea at current time. Intravenous fluids infusing and tolerated. Patient was seen by Dr. Niurka Knott, GI doctor on this morning and started on clear liquids. Patient receives intravenous antibiotics per doctor orders. Call light placed within reach at all times.

## 2022-03-26 NOTE — PLAN OF CARE
Patient vital signs stable. Patient NPO to rest the bowel and will receive IV abx. Patient updated on plan of care. Frequent rounding, call light in reach, safety precautions in place. Problem: Patient Centered Care  Goal: Patient preferences are identified and integrated in the patient's plan of care  Description: Interventions:  - What would you like us to know as we care for you?  From home with my spouse  - Provide timely, complete, and accurate information to patient/family  - Incorporate patient and family knowledge, values, beliefs, and cultural backgrounds into the planning and delivery of care  - Encourage patient/family to participate in care and decision-making at the level they choose  - Honor patient and family perspectives and choices  Outcome: Progressing     Problem: Patient/Family Goals  Goal: Patient/Family Long Term Goal  Description: Patient's Long Term Goal: Go back home    Interventions:  -   - See additional Care Plan goals for specific interventions  Outcome: Progressing  Goal: Patient/Family Short Term Goal  Description: Patient's Short Term Goal: Treating diverticulitis    Interventions:   - IV abx, rest the bowel  - See additional Care Plan goals for specific interventions  Outcome: Progressing     Problem: GASTROINTESTINAL - ADULT  Goal: Minimal or absence of nausea and vomiting  Description: INTERVENTIONS:  - Maintain adequate hydration with IV or PO as ordered and tolerated  - Nasogastric tube to low intermittent suction as ordered  - Evaluate effectiveness of ordered antiemetic medications  - Provide nonpharmacologic comfort measures as appropriate  - Advance diet as tolerated, if ordered  - Obtain nutritional consult as needed  - Evaluate fluid balance  Outcome: Progressing  Goal: Maintains or returns to baseline bowel function  Description: INTERVENTIONS:  - Assess bowel function  - Maintain adequate hydration with IV or PO as ordered and tolerated  - Evaluate effectiveness of GI medications  - Encourage mobilization and activity  - Obtain nutritional consult as needed  - Establish a toileting routine/schedule  - Consider collaborating with pharmacy to review patient's medication profile  Outcome: Progressing     Problem: SKIN/TISSUE INTEGRITY - ADULT  Goal: Skin integrity remains intact  Description: INTERVENTIONS  - Assess and document risk factors for pressure ulcer development  - Assess and document skin integrity  - Monitor for areas of redness and/or skin breakdown  - Initiate interventions, skin care algorithm/standards of care as needed  Outcome: Progressing     Problem: PAIN - ADULT  Goal: Verbalizes/displays adequate comfort level or patient's stated pain goal  Description: INTERVENTIONS:  - Encourage pt to monitor pain and request assistance  - Assess pain using appropriate pain scale  - Administer analgesics based on type and severity of pain and evaluate response  - Implement non-pharmacological measures as appropriate and evaluate response  - Consider cultural and social influences on pain and pain management  - Manage/alleviate anxiety  - Utilize distraction and/or relaxation techniques  - Monitor for opioid side effects  - Notify MD/LIP if interventions unsuccessful or patient reports new pain  - Anticipate increased pain with activity and pre-medicate as appropriate  Outcome: Progressing     Problem: SAFETY ADULT - FALL  Goal: Free from fall injury  Description: INTERVENTIONS:  - Assess pt frequently for physical needs  - Identify cognitive and physical deficits and behaviors that affect risk of falls.   - [place] fall precautions as indicated by assessment.  - Educate pt/family on patient safety including physical limitations  - Instruct pt to call for assistance with activity based on assessment  - Modify environment to reduce risk of injury  - Provide assistive devices as appropriate  - Consider OT/PT consult to assist with strengthening/mobility  - Encourage toileting schedule  Outcome: Progressing     Problem: DISCHARGE PLANNING  Goal: Discharge to home or other facility with appropriate resources  Description: INTERVENTIONS:  - Identify barriers to discharge w/pt and caregiver  - Include patient/family/discharge partner in discharge planning  - Arrange for needed discharge resources and transportation as appropriate  - Identify discharge learning needs (meds, wound care, etc)  - Arrange for interpreters to assist at discharge as needed  - Consider post-discharge preferences of patient/family/discharge partner  - Complete POLST form as appropriate  - Assess patient's ability to be responsible for managing their own health  - Refer to Case Management Department for coordinating discharge planning if the patient needs post-hospital services based on physician/LIP order or complex needs related to functional status, cognitive ability or social support system  Outcome: Progressing

## 2022-03-26 NOTE — ED QUICK NOTES
Pt to the ed for complaints of nausea and lower abdominal cramping for approx 5 days. She states that she has been having frequent loose bowel movements and a decrease in appetite. No reported fevers or vomiting.

## 2022-03-26 NOTE — ED INITIAL ASSESSMENT (HPI)
Patient ambulatory to ED with complaint of lower abd pain and mucousy stools since monday. Denies urinary symptoms. Endorses many bowel movements today. Decreased PO intake. Patient is AXOX4.

## 2022-03-26 NOTE — CM/SW NOTE
03/26/22 1600   CM/SW Referral Data   Referral Source    Reason for Referral Discharge planning   Informant Patient   Pertinent Medical Hx   Does patient have an established PCP? Yes  Juliana Thakkar   Patient Info   Patient's Current Mental Status at Time of Assessment Alert;Oriented   Patient's [address]   Patient lives with Spouse/Significant other   Patient Status Prior to Admission   Independent with ADLs and Mobility Yes   Discharge Needs   Anticipated D/C needs No anticipated discharge needs     Dx acute diverticulitis, on IV abx. From home w/spouse, independent and active prior to dx. No home care needs anticipated on dc. Plan: Home w/spouse pending medical clearance. / to remain available for support and/or discharge planning.      AMERICA Burns    [phone]

## 2022-03-27 LAB
DEPRECATED RDW RBC AUTO: 47.6 FL (ref 35.1–46.3)
ERYTHROCYTE [DISTWIDTH] IN BLOOD BY AUTOMATED COUNT: 14 % (ref 11–15)
HCT VFR BLD AUTO: 35.3 %
HGB BLD-MCNC: 11 G/DL
MCH RBC QN AUTO: 28.7 PG (ref 26–34)
MCHC RBC AUTO-ENTMCNC: 31.2 G/DL (ref 31–37)
MCV RBC AUTO: 92.2 FL
PLATELET # BLD AUTO: 329 10(3)UL (ref 150–450)
RBC # BLD AUTO: 3.83 X10(6)UL
WBC # BLD AUTO: 8.5 X10(3) UL (ref 4–11)

## 2022-03-27 PROCEDURE — 85027 COMPLETE CBC AUTOMATED: CPT | Performed by: INTERNAL MEDICINE

## 2022-03-27 PROCEDURE — C9113 INJ PANTOPRAZOLE SODIUM, VIA: HCPCS | Performed by: INTERNAL MEDICINE

## 2022-03-27 RX ORDER — PANTOPRAZOLE SODIUM 40 MG/1
40 TABLET, DELAYED RELEASE ORAL
Status: DISCONTINUED | OUTPATIENT
Start: 2022-03-27 | End: 2022-03-28

## 2022-03-27 NOTE — PLAN OF CARE
Problem: Patient Centered Care  Goal: Patient preferences are identified and integrated in the patient's plan of care  Description: Interventions:  - What would you like us to know as we care for you?   - Provide timely, complete, and accurate information to patient/family  - Incorporate patient and family knowledge, values, beliefs, and cultural backgrounds into the planning and delivery of care  - Encourage patient/family to participate in care and decision-making at the level they choose  - Honor patient and family perspectives and choices  Outcome: Progressing     Problem: Patient/Family Goals  Goal: Patient/Family Long Term Goal  Description: Patient's Long Term Goal: Pain resolved! Interventions:  - Pain level is assess using pain scale from 1 to 10.  - Patient is medicated as needed for pain or discomfort. - See additional Care Plan goals for specific interventions  Outcome: Progressing  Goal: Patient/Family Short Term Goal  Description: Patient's Short Term Goal: Infection resolved! Interventions:   - Vital signs and labs are monitored. - Patient receives intravenous antibiotics.    - See additional Care Plan goals for specific interventions  Outcome: Progressing     Problem: GASTROINTESTINAL - ADULT  Goal: Minimal or absence of nausea and vomiting  Description: INTERVENTIONS:  - Maintain adequate hydration with IV or PO as ordered and tolerated  - Nasogastric tube to low intermittent suction as ordered  - Evaluate effectiveness of ordered antiemetic medications  - Provide nonpharmacologic comfort measures as appropriate  - Advance diet as tolerated, if ordered  - Obtain nutritional consult as needed  - Evaluate fluid balance  Outcome: Progressing  Goal: Maintains or returns to baseline bowel function  Description: INTERVENTIONS:  - Assess bowel function  - Maintain adequate hydration with IV or PO as ordered and tolerated  - Evaluate effectiveness of GI medications  - Encourage mobilization and activity  - Obtain nutritional consult as needed  - Establish a toileting routine/schedule  - Consider collaborating with pharmacy to review patient's medication profile  Outcome: Progressing     Problem: SKIN/TISSUE INTEGRITY - ADULT  Goal: Skin integrity remains intact  Description: INTERVENTIONS  - Assess and document risk factors for pressure ulcer development  - Assess and document skin integrity  - Monitor for areas of redness and/or skin breakdown  - Initiate interventions, skin care algorithm/standards of care as needed  Outcome: Progressing     Problem: PAIN - ADULT  Goal: Verbalizes/displays adequate comfort level or patient's stated pain goal  Description: INTERVENTIONS:  - Encourage pt to monitor pain and request assistance  - Assess pain using appropriate pain scale  - Administer analgesics based on type and severity of pain and evaluate response  - Implement non-pharmacological measures as appropriate and evaluate response  - Consider cultural and social influences on pain and pain management  - Manage/alleviate anxiety  - Utilize distraction and/or relaxation techniques  - Monitor for opioid side effects  - Notify MD/LIP if interventions unsuccessful or patient reports new pain  - Anticipate increased pain with activity and pre-medicate as appropriate  Outcome: Progressing     Problem: SAFETY ADULT - FALL  Goal: Free from fall injury  Description: INTERVENTIONS:  - Assess pt frequently for physical needs  - Identify cognitive and physical deficits and behaviors that affect risk of falls.   - [place] fall precautions as indicated by assessment.  - Educate pt/family on patient safety including physical limitations  - Instruct pt to call for assistance with activity based on assessment  - Modify environment to reduce risk of injury  - Provide assistive devices as appropriate  - Consider OT/PT consult to assist with strengthening/mobility  - Encourage toileting schedule  Outcome: Progressing     Problem: DISCHARGE PLANNING  Goal: Discharge to home or other facility with appropriate resources  Description: INTERVENTIONS:  - Identify barriers to discharge w/pt and caregiver  - Include patient/family/discharge partner in discharge planning  - Arrange for needed discharge resources and transportation as appropriate  - Identify discharge learning needs (meds, wound care, etc)  - Arrange for interpreters to assist at discharge as needed  - Consider post-discharge preferences of patient/family/discharge partner  - Complete POLST form as appropriate  - Assess patient's ability to be responsible for managing their own health  - Refer to Case Management Department for coordinating discharge planning if the patient needs post-hospital services based on physician/LIP order or complex needs related to functional status, cognitive ability or social support system  Outcome: Progressing    Patient is presently resting in the bed. Alert x 4. Patient denied pain or discomfort at current time. Vital signs and labs are monitored. Patient diet advanced from full liquid diet to low fiber/soft diet. Patient tolerates diet well. Self care and independent. Patient took shower this morning. Patient receives intravenous antibiotics per orders. No bowel movement or diarrhea noted. Possible discharge tomorrow if medically cleared by doctor. Patient protonix changed from intravenous to oral that will start tomorrow morning.

## 2022-03-27 NOTE — PLAN OF CARE
Patient is tolerating full liquid diet without any complaints of nausea of vomiting. Mid tenderness to her left lower quadrant. Remains on IV antibiotics and afebrile. Vital signs are stable.      Problem: Patient Centered Care  Goal: Patient preferences are identified and integrated in the patient's plan of care  Description: Interventions:  - What would you like us to know as we care for you?   - Provide timely, complete, and accurate information to patient/family  - Incorporate patient and family knowledge, values, beliefs, and cultural backgrounds into the planning and delivery of care  - Encourage patient/family to participate in care and decision-making at the level they choose  - Honor patient and family perspectives and choices  Outcome: Progressing    Problem: GASTROINTESTINAL - ADULT  Goal: Minimal or absence of nausea and vomiting  Description: INTERVENTIONS:  - Maintain adequate hydration with IV or PO as ordered and tolerated  - Nasogastric tube to low intermittent suction as ordered  - Evaluate effectiveness of ordered antiemetic medications  - Provide nonpharmacologic comfort measures as appropriate  - Advance diet as tolerated, if ordered  - Obtain nutritional consult as needed  - Evaluate fluid balance  Outcome: Progressing  Goal: Maintains or returns to baseline bowel function  Description: INTERVENTIONS:  - Assess bowel function  - Maintain adequate hydration with IV or PO as ordered and tolerated  - Evaluate effectiveness of GI medications  - Encourage mobilization and activity  - Obtain nutritional consult as needed  - Establish a toileting routine/schedule  - Consider collaborating with pharmacy to review patient's medication profile  Outcome: Progressing     Problem: SKIN/TISSUE INTEGRITY - ADULT  Goal: Skin integrity remains intact  Description: INTERVENTIONS  - Assess and document risk factors for pressure ulcer development  - Assess and document skin integrity  - Monitor for areas of redness and/or skin breakdown  - Initiate interventions, skin care algorithm/standards of care as needed  Outcome: Progressing     Problem: PAIN - ADULT  Goal: Verbalizes/displays adequate comfort level or patient's stated pain goal  Description: INTERVENTIONS:  - Encourage pt to monitor pain and request assistance  - Assess pain using appropriate pain scale  - Administer analgesics based on type and severity of pain and evaluate response  - Implement non-pharmacological measures as appropriate and evaluate response  - Consider cultural and social influences on pain and pain management  - Manage/alleviate anxiety  - Utilize distraction and/or relaxation techniques  - Monitor for opioid side effects  - Notify MD/LIP if interventions unsuccessful or patient reports new pain  - Anticipate increased pain with activity and pre-medicate as appropriate  Outcome: Progressing     Problem: SAFETY ADULT - FALL  Goal: Free from fall injury  Description: INTERVENTIONS:  - Assess pt frequently for physical needs  - Identify cognitive and physical deficits and behaviors that affect risk of falls.   - [place] fall precautions as indicated by assessment.  - Educate pt/family on patient safety including physical limitations  - Instruct pt to call for assistance with activity based on assessment  - Modify environment to reduce risk of injury  - Provide assistive devices as appropriate  - Consider OT/PT consult to assist with strengthening/mobility  - Encourage toileting schedule  Outcome: Progressing     Problem: DISCHARGE PLANNING  Goal: Discharge to home or other facility with appropriate resources  Description: INTERVENTIONS:  - Identify barriers to discharge w/pt and caregiver  - Include patient/family/discharge partner in discharge planning  - Arrange for needed discharge resources and transportation as appropriate  - Identify discharge learning needs (meds, wound care, etc)  - Arrange for interpreters to assist at discharge as needed  - Consider post-discharge preferences of patient/family/discharge partner  - Complete POLST form as appropriate  - Assess patient's ability to be responsible for managing their own health  - Refer to Case Management Department for coordinating discharge planning if the patient needs post-hospital services based on physician/LIP order or complex needs related to functional status, cognitive ability or social support system  Outcome: Progressing

## 2022-03-28 VITALS
HEART RATE: 86 BPM | BODY MASS INDEX: 32.57 KG/M2 | TEMPERATURE: 98 F | OXYGEN SATURATION: 94 % | SYSTOLIC BLOOD PRESSURE: 134 MMHG | DIASTOLIC BLOOD PRESSURE: 73 MMHG | HEIGHT: 67 IN | WEIGHT: 207.5 LBS | RESPIRATION RATE: 16 BRPM

## 2022-03-28 RX ORDER — METRONIDAZOLE 500 MG/1
500 TABLET ORAL 3 TIMES DAILY
Qty: 21 TABLET | Refills: 0 | Status: SHIPPED | OUTPATIENT
Start: 2022-03-28 | End: 2022-04-04

## 2022-03-28 RX ORDER — VANCOMYCIN HYDROCHLORIDE 125 MG/1
125 CAPSULE ORAL DAILY
Status: DISCONTINUED | OUTPATIENT
Start: 2022-03-28 | End: 2022-03-28

## 2022-03-28 RX ORDER — LEVOFLOXACIN 500 MG/1
500 TABLET, FILM COATED ORAL DAILY
Qty: 7 TABLET | Refills: 0 | Status: SHIPPED | OUTPATIENT
Start: 2022-03-28 | End: 2022-04-04

## 2022-03-28 NOTE — TELEPHONE ENCOUNTER
From: Jackie Aguilar  To: Gavi Mann. Gisela Fritz MD  Sent: 3/25/2022 9:47 AM CDT  Subject: GI distress    I have had abdominal pain since Monday that wont go away. I have called for an appointment with the gastroenterologist but cannot get in until May 24, for any of the gastro people related with all of the [place] [address] locations. Would like you advice.     Sharon Perea

## 2022-03-28 NOTE — PLAN OF CARE
Problem: Patient Centered Care  Goal: Patient preferences are identified and integrated in the patient's plan of care  Description: Interventions:  - What would you like us to know as we care for you?  I live home with my    - Provide timely, complete, and accurate information to patient/family  - Incorporate patient and family knowledge, values, beliefs, and cultural backgrounds into the planning and delivery of care  - Encourage patient/family to participate in care and decision-making at the level they choose  - Honor patient and family perspectives and choices  Outcome: Progressing     Problem: Patient/Family Goals  Goal: Patient/Family Long Term Goal  Description: Patient's Long Term Goal: return home    Interventions:  - - Monitor vitals  - Monitor appropriate labs  - Administer medications as ordered  - Follow MD's orders  - Update patient on plan of care   - Discharge planning   - See additional Care Plan goals for specific interventions  Outcome: Progressing  Goal: Patient/Family Short Term Goal  Description: Patient's Short Term Goal:     Interventions:   - - Monitor vitals  - Monitor appropriate labs  - Administer medications as ordered  - Follow MD's orders  - Update patient on plan of care   - Discharge planning   - See additional Care Plan goals for specific interventions  Outcome: Progressing     Problem: GASTROINTESTINAL - ADULT  Goal: Minimal or absence of nausea and vomiting  Description: INTERVENTIONS:  - Maintain adequate hydration with IV or PO as ordered and tolerated  - Nasogastric tube to low intermittent suction as ordered  - Evaluate effectiveness of ordered antiemetic medications  - Provide nonpharmacologic comfort measures as appropriate  - Advance diet as tolerated, if ordered  - Obtain nutritional consult as needed  - Evaluate fluid balance  Outcome: Progressing  Goal: Maintains or returns to baseline bowel function  Description: INTERVENTIONS:  - Assess bowel function  - Maintain adequate hydration with IV or PO as ordered and tolerated  - Evaluate effectiveness of GI medications  - Encourage mobilization and activity  - Obtain nutritional consult as needed  - Establish a toileting routine/schedule  - Consider collaborating with pharmacy to review patient's medication profile  Outcome: Progressing     Problem: SKIN/TISSUE INTEGRITY - ADULT  Goal: Skin integrity remains intact  Description: INTERVENTIONS  - Assess and document risk factors for pressure ulcer development  - Assess and document skin integrity  - Monitor for areas of redness and/or skin breakdown  - Initiate interventions, skin care algorithm/standards of care as needed  Outcome: Progressing     Problem: PAIN - ADULT  Goal: Verbalizes/displays adequate comfort level or patient's stated pain goal  Description: INTERVENTIONS:  - Encourage pt to monitor pain and request assistance  - Assess pain using appropriate pain scale  - Administer analgesics based on type and severity of pain and evaluate response  - Implement non-pharmacological measures as appropriate and evaluate response  - Consider cultural and social influences on pain and pain management  - Manage/alleviate anxiety  - Utilize distraction and/or relaxation techniques  - Monitor for opioid side effects  - Notify MD/LIP if interventions unsuccessful or patient reports new pain  - Anticipate increased pain with activity and pre-medicate as appropriate  Outcome: Progressing     Problem: SAFETY ADULT - FALL  Goal: Free from fall injury  Description: INTERVENTIONS:  - Assess pt frequently for physical needs  - Identify cognitive and physical deficits and behaviors that affect risk of falls.   - [place] fall precautions as indicated by assessment.  - Educate pt/family on patient safety including physical limitations  - Instruct pt to call for assistance with activity based on assessment  - Modify environment to reduce risk of injury  - Provide assistive devices as appropriate  - Consider OT/PT consult to assist with strengthening/mobility  - Encourage toileting schedule  Outcome: Progressing     Problem: DISCHARGE PLANNING  Goal: Discharge to home or other facility with appropriate resources  Description: INTERVENTIONS:  - Identify barriers to discharge w/pt and caregiver  - Include patient/family/discharge partner in discharge planning  - Arrange for needed discharge resources and transportation as appropriate  - Identify discharge learning needs (meds, wound care, etc)  - Arrange for interpreters to assist at discharge as needed  - Consider post-discharge preferences of patient/family/discharge partner  - Complete POLST form as appropriate  - Assess patient's ability to be responsible for managing their own health  - Refer to Case Management Department for coordinating discharge planning if the patient needs post-hospital services based on physician/LIP order or complex needs related to functional status, cognitive ability or social support system  Outcome: Progressing

## 2022-03-28 NOTE — PLAN OF CARE
Patient is alert and oriented x4. On room air, denies SOB, vitals stable. Educated patient on plan of care, general admission education. Call light within reach. Bed in lowest position. All needs addressed. Hourly rounding maintained. Pt educated on discharge with instructions for follow up, discharge medications and appointments. Pt sent home with belongings, and being picked up by . Pt stable upon discharge. Problem: Patient Centered Care  Goal: Patient preferences are identified and integrated in the patient's plan of care  Description: Interventions:  - What would you like us to know as we care for you?  From home with .  - Provide timely, complete, and accurate information to patient/family  - Incorporate patient and family knowledge, values, beliefs, and cultural backgrounds into the planning and delivery of care  - Encourage patient/family to participate in care and decision-making at the level they choose  - Honor patient and family perspectives and choices  Outcome: Completed     Problem: Patient/Family Goals  Goal: Patient/Family Long Term Goal  Description: Patient's Long Term Goal:    Interventions:  - See additional Care Plan goals for specific interventions  Outcome: Completed  Goal: Patient/Family Short Term Goal  Description: Patient's Short Term Goal:     Interventions:   -   - See additional Care Plan goals for specific interventions  Outcome: Completed     Problem: GASTROINTESTINAL - ADULT  Goal: Minimal or absence of nausea and vomiting  Description: INTERVENTIONS:  - Maintain adequate hydration with IV or PO as ordered and tolerated  - Nasogastric tube to low intermittent suction as ordered  - Evaluate effectiveness of ordered antiemetic medications  - Provide nonpharmacologic comfort measures as appropriate  - Advance diet as tolerated, if ordered  - Obtain nutritional consult as needed  - Evaluate fluid balance  Outcome: Completed  Goal: Maintains or returns to baseline bowel function  Description: INTERVENTIONS:  - Assess bowel function  - Maintain adequate hydration with IV or PO as ordered and tolerated  - Evaluate effectiveness of GI medications  - Encourage mobilization and activity  - Obtain nutritional consult as needed  - Establish a toileting routine/schedule  - Consider collaborating with pharmacy to review patient's medication profile  Outcome: Completed     Problem: SKIN/TISSUE INTEGRITY - ADULT  Goal: Skin integrity remains intact  Description: INTERVENTIONS  - Assess and document risk factors for pressure ulcer development  - Assess and document skin integrity  - Monitor for areas of redness and/or skin breakdown  - Initiate interventions, skin care algorithm/standards of care as needed  Outcome: Completed     Problem: PAIN - ADULT  Goal: Verbalizes/displays adequate comfort level or patient's stated pain goal  Description: INTERVENTIONS:  - Encourage pt to monitor pain and request assistance  - Assess pain using appropriate pain scale  - Administer analgesics based on type and severity of pain and evaluate response  - Implement non-pharmacological measures as appropriate and evaluate response  - Consider cultural and social influences on pain and pain management  - Manage/alleviate anxiety  - Utilize distraction and/or relaxation techniques  - Monitor for opioid side effects  - Notify MD/LIP if interventions unsuccessful or patient reports new pain  - Anticipate increased pain with activity and pre-medicate as appropriate  Outcome: Completed     Problem: SAFETY ADULT - FALL  Goal: Free from fall injury  Description: INTERVENTIONS:  - Assess pt frequently for physical needs  - Identify cognitive and physical deficits and behaviors that affect risk of falls.   - [place] fall precautions as indicated by assessment.  - Educate pt/family on patient safety including physical limitations  - Instruct pt to call for assistance with activity based on assessment  - Modify environment to reduce risk of injury  - Provide assistive devices as appropriate  - Consider OT/PT consult to assist with strengthening/mobility  - Encourage toileting schedule  Outcome: Completed     Problem: DISCHARGE PLANNING  Goal: Discharge to home or other facility with appropriate resources  Description: INTERVENTIONS:  - Identify barriers to discharge w/pt and caregiver  - Include patient/family/discharge partner in discharge planning  - Arrange for needed discharge resources and transportation as appropriate  - Identify discharge learning needs (meds, wound care, etc)  - Arrange for interpreters to assist at discharge as needed  - Consider post-discharge preferences of patient/family/discharge partner  - Complete POLST form as appropriate  - Assess patient's ability to be responsible for managing their own health  - Refer to Case Management Department for coordinating discharge planning if the patient needs post-hospital services based on physician/LIP order or complex needs related to functional status, cognitive ability or social support system  Outcome: Completed

## 2022-03-29 ENCOUNTER — TELEPHONE (OUTPATIENT)
Dept: GASTROENTEROLOGY | Facility: CLINIC | Age: 62
End: 2022-03-29

## 2022-03-29 ENCOUNTER — PATIENT MESSAGE (OUTPATIENT)
Dept: GASTROENTEROLOGY | Facility: CLINIC | Age: 62
End: 2022-03-29

## 2022-03-29 DIAGNOSIS — Z12.11 COLON CANCER SCREENING: Primary | ICD-10-CM

## 2022-03-29 NOTE — TELEPHONE ENCOUNTER
GI Staff:   Patient discharged from hospital, needs clinic visit with me on 4/28 @ 10:45 AM @ [facility]. Needs to be schedule for CLN w/MAC on June 6th at [address] Dx: screening.  Thx

## 2022-03-29 NOTE — TELEPHONE ENCOUNTER
Scheduled for:  Colonoscopy [address]  Provider Name:  Dr Td Bradshaw  Date:  06/06/2022  Location:   [facility]   Sedation:  [facility]  Time:  0845 patient is aware [facility] will call with time of arrival   Prep:    Meds/Allergies Reconciled?:  Physician reviewed   Diagnosis with codes:    Was patient informed to call insurance with codes (Y/N):  Yes, I confirmed [employer] PPO insurance with the patient. Referral sent?:  yes  [facility] or [address] notified?:  I sent an electronic request to Endo Scheduling and received a confirmation today. Medication Orders: This patient verbally confirmed that she is not taking:   Iron, blood thinners, and is not diabetic   Not latex allergy, Not PCN allergy and does not have a pacemaker  Patient is aware to hold her vitamins and supplements x 7 days prior to procedure      Misc Orders:   Patient is aware she will be scheduled for a covid 19 test prior to procedure       Further instructions given by staff:   This patient had no questions regarding the prep instructions

## 2022-03-29 NOTE — PAYOR COMM NOTE
Discharge Notification    Patient Name: Jerardo Soares  Payor: Deniz Aleman POS/RAYSA  Subscriber #: [id number]  Authorization Number: [id number]  Admit Date/Time: 3/25/2022 7:41 PM  Discharge Date/Time: 3/28/2022 5:31 PM

## 2022-03-29 NOTE — TELEPHONE ENCOUNTER
Dr Shwetha Patel    Patient is scheduled for Colonoscopy procedure on 06/06/2022 @ [address]    Patient is requesting for bowel prep to be sent to pharmacy on file     Please advise which prep instructions to use for this patients procedure     Thank you   Carroll Chadwick

## 2022-03-29 NOTE — TELEPHONE ENCOUNTER
From: Evelyn Larsen  To: Vincent Tipton MD  Sent: 3/29/2022 12:20 PM CDT  Subject: taking metronidazole    In researching the levofloxacin it says it is not recommended to take while taking Metronidazole. Am I going to have a problem?     Miriam Villa

## 2022-03-29 NOTE — TELEPHONE ENCOUNTER
Noted message below.     Your appointments     Date & Time Appointment Department [facility])    Apr 28, 2022 10:45 AM CDT Follow Up Visit with Praneeth Varma, [address], [address], [address] ([place])

## 2022-03-29 NOTE — TELEPHONE ENCOUNTER
Left message to call back. If patient returns call please offer appt on 4/28 at 10:45am at [facility]. Please route back if confirm/decline.

## 2022-03-30 ENCOUNTER — TELEPHONE (OUTPATIENT)
Dept: INTERNAL MEDICINE CLINIC | Facility: CLINIC | Age: 62
End: 2022-03-30

## 2022-03-30 ENCOUNTER — PATIENT OUTREACH (OUTPATIENT)
Dept: CASE MANAGEMENT | Age: 62
End: 2022-03-30

## 2022-03-30 PROCEDURE — 1111F DSCHRG MED/CURRENT MED MERGE: CPT

## 2022-03-30 NOTE — TELEPHONE ENCOUNTER
We had discussed about this when she was in the hospital.  She will be leaving tomorrow and she will not be back until later. And then she has the most important follow-up with GI she has a colonoscopy scheduled. So be hard for her to get it between. She said just keep that appointment.

## 2022-03-30 NOTE — TELEPHONE ENCOUNTER
Spoke to pt for TCM today. Pt does not have HFU appt scheduled at this time. TCM/HFU appt recommended by 4/11/2022 as pt is at risk for readmission. [facility] attempted to schedule TCM HFU within the TCM timeframe by 4/11/2022, patient declines stating she will be out of town not returning until 4/26/2022. Next scheduled appointment is 5/15/2022 at 11:30. Message sent to MD's office. BOOK BY DATE (last date for TCM): 4/11/2022    TRIAGE:  Please notify Dr. Wellington Meng of the above and see if she wants to see the patient sooner than 5/16/2022, please let the patient know if she is to be sooner and schedule a sooner appointment. Thank you!

## 2022-04-04 ENCOUNTER — MED REC SCAN ONLY (OUTPATIENT)
Dept: INTERNAL MEDICINE CLINIC | Facility: CLINIC | Age: 62
End: 2022-04-04

## 2022-04-28 ENCOUNTER — OFFICE VISIT (OUTPATIENT)
Dept: GASTROENTEROLOGY | Facility: CLINIC | Age: 62
End: 2022-04-28
Payer: COMMERCIAL

## 2022-04-28 VITALS
HEART RATE: 102 BPM | SYSTOLIC BLOOD PRESSURE: 151 MMHG | DIASTOLIC BLOOD PRESSURE: 78 MMHG | WEIGHT: 209 LBS | HEIGHT: 67 IN | BODY MASS INDEX: 32.8 KG/M2

## 2022-04-28 DIAGNOSIS — K57.92 ACUTE DIVERTICULITIS: Primary | ICD-10-CM

## 2022-04-28 PROCEDURE — 3008F BODY MASS INDEX DOCD: CPT | Performed by: INTERNAL MEDICINE

## 2022-04-28 PROCEDURE — 99213 OFFICE O/P EST LOW 20 MIN: CPT | Performed by: INTERNAL MEDICINE

## 2022-04-28 PROCEDURE — 3078F DIAST BP <80 MM HG: CPT | Performed by: INTERNAL MEDICINE

## 2022-04-28 PROCEDURE — 3077F SYST BP >= 140 MM HG: CPT | Performed by: INTERNAL MEDICINE

## 2022-04-28 RX ORDER — POLYETHYLENE GLYCOL 3350, SODIUM SULFATE ANHYDROUS, SODIUM BICARBONATE, SODIUM CHLORIDE, POTASSIUM CHLORIDE 236; 22.74; 6.74; 5.86; 2.97 G/4L; G/4L; G/4L; G/4L; G/4L
POWDER, FOR SOLUTION ORAL
COMMUNITY
Start: 2022-03-29

## 2022-04-28 NOTE — PATIENT INSTRUCTIONS
1. Either align or florastor probiotic for 1 month  2. Take yogurt 1-2x a week  3.  Will proceed with colonoscopy in June 2022

## 2022-05-10 ENCOUNTER — HOSPITAL ENCOUNTER (OUTPATIENT)
Dept: CT IMAGING | Facility: HOSPITAL | Age: 62
Discharge: HOME | End: 2022-05-10
Attending: INTERNAL MEDICINE

## 2022-05-10 DIAGNOSIS — Z13.6 SCREENING FOR CARDIOVASCULAR CONDITION: ICD-10-CM

## 2022-05-12 PROBLEM — L90.5: Status: ACTIVE | Noted: 2022-05-12

## 2022-05-16 ENCOUNTER — LAB ENCOUNTER (OUTPATIENT)
Dept: LAB | Facility: HOSPITAL | Age: 62
End: 2022-05-16
Attending: INTERNAL MEDICINE
Payer: COMMERCIAL

## 2022-05-16 ENCOUNTER — OFFICE VISIT (OUTPATIENT)
Dept: INTERNAL MEDICINE CLINIC | Facility: CLINIC | Age: 62
End: 2022-05-16
Payer: COMMERCIAL

## 2022-05-16 VITALS
HEIGHT: 67 IN | WEIGHT: 206.81 LBS | DIASTOLIC BLOOD PRESSURE: 80 MMHG | BODY MASS INDEX: 32.46 KG/M2 | SYSTOLIC BLOOD PRESSURE: 132 MMHG | HEART RATE: 87 BPM | OXYGEN SATURATION: 97 % | TEMPERATURE: 97 F | RESPIRATION RATE: 16 BRPM

## 2022-05-16 DIAGNOSIS — Z12.4 PAP SMEAR FOR CERVICAL CANCER SCREENING: ICD-10-CM

## 2022-05-16 DIAGNOSIS — Z71.85 VACCINE COUNSELING: ICD-10-CM

## 2022-05-16 DIAGNOSIS — R73.9 HYPERGLYCEMIA: Primary | ICD-10-CM

## 2022-05-16 DIAGNOSIS — R06.2 WHEEZING: ICD-10-CM

## 2022-05-16 DIAGNOSIS — R93.89 ABNORMAL CT OF THE CHEST: ICD-10-CM

## 2022-05-16 DIAGNOSIS — R93.5 ABNORMAL CT OF THE ABDOMEN: ICD-10-CM

## 2022-05-16 DIAGNOSIS — E78.2 MIXED HYPERLIPIDEMIA: ICD-10-CM

## 2022-05-16 DIAGNOSIS — R23.2 HOT FLASHES: ICD-10-CM

## 2022-05-16 DIAGNOSIS — N28.9 RENAL INSUFFICIENCY: ICD-10-CM

## 2022-05-16 DIAGNOSIS — R73.9 HYPERGLYCEMIA: ICD-10-CM

## 2022-05-16 DIAGNOSIS — Z12.11 COLON CANCER SCREENING: ICD-10-CM

## 2022-05-16 DIAGNOSIS — I10 PRIMARY HYPERTENSION: ICD-10-CM

## 2022-05-16 DIAGNOSIS — Z12.31 BREAST CANCER SCREENING BY MAMMOGRAM: ICD-10-CM

## 2022-05-16 LAB
ALBUMIN SERPL-MCNC: 4.3 G/DL (ref 3.4–5)
ALBUMIN/GLOB SERPL: 1.1 {RATIO} (ref 1–2)
ALP LIVER SERPL-CCNC: 70 U/L
ALT SERPL-CCNC: 24 U/L
ANION GAP SERPL CALC-SCNC: 7 MMOL/L (ref 0–18)
AST SERPL-CCNC: 18 U/L (ref 15–37)
BILIRUB SERPL-MCNC: 0.5 MG/DL (ref 0.1–2)
BUN BLD-MCNC: 16 MG/DL (ref 7–18)
BUN/CREAT SERPL: 15.8 (ref 10–20)
CALCIUM BLD-MCNC: 9.9 MG/DL (ref 8.5–10.1)
CHLORIDE SERPL-SCNC: 107 MMOL/L (ref 98–112)
CHOLEST SERPL-MCNC: 278 MG/DL (ref ?–200)
CO2 SERPL-SCNC: 25 MMOL/L (ref 21–32)
CORTIS SERPL-MCNC: 9.8 UG/DL
CREAT BLD-MCNC: 1.01 MG/DL
DHEA-S SERPL-MCNC: 177.9 UG/DL
EST. AVERAGE GLUCOSE BLD GHB EST-MCNC: 131 MG/DL (ref 68–126)
FASTING PATIENT LIPID ANSWER: YES
FASTING STATUS PATIENT QL REPORTED: YES
GLOBULIN PLAS-MCNC: 3.8 G/DL (ref 2.8–4.4)
GLUCOSE BLD-MCNC: 102 MG/DL (ref 70–99)
HBA1C MFR BLD: 6.2 % (ref ?–5.7)
HDLC SERPL-MCNC: 41 MG/DL (ref 40–59)
LDLC SERPL CALC-MCNC: 178 MG/DL (ref ?–100)
NONHDLC SERPL-MCNC: 237 MG/DL (ref ?–130)
OSMOLALITY SERPL CALC.SUM OF ELEC: 289 MOSM/KG (ref 275–295)
POTASSIUM SERPL-SCNC: 4.2 MMOL/L (ref 3.5–5.1)
PROT SERPL-MCNC: 8.1 G/DL (ref 6.4–8.2)
SODIUM SERPL-SCNC: 139 MMOL/L (ref 136–145)
TRIGL SERPL-MCNC: 304 MG/DL (ref 30–149)
VLDLC SERPL CALC-MCNC: 63 MG/DL (ref 0–30)

## 2022-05-16 PROCEDURE — 83835 ASSAY OF METANEPHRINES: CPT

## 2022-05-16 PROCEDURE — 82627 DEHYDROEPIANDROSTERONE: CPT

## 2022-05-16 PROCEDURE — 82671 ASSAY OF ESTROGENS: CPT

## 2022-05-16 PROCEDURE — 84403 ASSAY OF TOTAL TESTOSTERONE: CPT

## 2022-05-16 PROCEDURE — 3079F DIAST BP 80-89 MM HG: CPT | Performed by: INTERNAL MEDICINE

## 2022-05-16 PROCEDURE — 36415 COLL VENOUS BLD VENIPUNCTURE: CPT

## 2022-05-16 PROCEDURE — 80053 COMPREHEN METABOLIC PANEL: CPT

## 2022-05-16 PROCEDURE — 82533 TOTAL CORTISOL: CPT

## 2022-05-16 PROCEDURE — 3075F SYST BP GE 130 - 139MM HG: CPT | Performed by: INTERNAL MEDICINE

## 2022-05-16 PROCEDURE — 83036 HEMOGLOBIN GLYCOSYLATED A1C: CPT

## 2022-05-16 PROCEDURE — 82088 ASSAY OF ALDOSTERONE: CPT

## 2022-05-16 PROCEDURE — 82024 ASSAY OF ACTH: CPT

## 2022-05-16 PROCEDURE — 80061 LIPID PANEL: CPT

## 2022-05-16 PROCEDURE — 84402 ASSAY OF FREE TESTOSTERONE: CPT

## 2022-05-16 PROCEDURE — 99215 OFFICE O/P EST HI 40 MIN: CPT | Performed by: INTERNAL MEDICINE

## 2022-05-16 PROCEDURE — 3008F BODY MASS INDEX DOCD: CPT | Performed by: INTERNAL MEDICINE

## 2022-05-16 RX ORDER — METOPROLOL SUCCINATE 50 MG/1
TABLET, EXTENDED RELEASE ORAL
Qty: 45 TABLET | Refills: 0 | Status: SHIPPED | OUTPATIENT
Start: 2022-05-16 | End: 2022-06-20

## 2022-05-16 NOTE — PATIENT INSTRUCTIONS
ASSESSMENT/PLAN:   Hyperglycemia  (primary encounter diagnosis) Check blood. Primary hypertension ? Control. Careful with diet and excercise at least 30 minutes 3-4 times a week. Check blood pressures at different times on different days. Can purchase own blood pressure monitor. If not, check at local pharmacy. Bake foods more and grill occasionally. Avoid fried foods. No salt. Use other seasonings. Mixed hyperlipidemia Check blood. Vaccine counseling Covid booster recc. Can get at local pharmacy. Breast cancer screening by mammogram Check mammogram. Continue self breast exam every month. Colon cancer screening Has set for colonoscopy. Pap smear for cervical cancer screening To be done. Wheezing  Abnormal ct of the chest  Abnormal ct of the abdomen Check PFT's. Check blood. Orders Placed This Encounter      ACTH, Plasma      Aldosterone, Serum      Cortisol      Dehydroepiandrosterone Sulfate      Metanephrines, Plasma Free      Renin, Plasma      Pre-Procedure Covid-19 Testing by PCR (Dana)      Meds This Visit:  Requested Prescriptions      No prescriptions requested or ordered in this encounter       Imaging & Referrals:  None     RTC 5 months.  FU.

## 2022-05-17 ENCOUNTER — TELEPHONE (OUTPATIENT)
Dept: INTERNAL MEDICINE CLINIC | Facility: CLINIC | Age: 62
End: 2022-05-17

## 2022-05-17 ENCOUNTER — MED REC SCAN ONLY (OUTPATIENT)
Dept: INTERNAL MEDICINE CLINIC | Facility: CLINIC | Age: 62
End: 2022-05-17

## 2022-05-17 DIAGNOSIS — E78.2 MIXED HYPERLIPIDEMIA: Primary | ICD-10-CM

## 2022-05-17 NOTE — TELEPHONE ENCOUNTER
PA required for Nexletol 180 mg, [facility] [address] [facility] ph# [phone].

## 2022-05-17 NOTE — TELEPHONE ENCOUNTER
Prior authorization form has been filled out for nexletol and faxed to Search to Phone to [phone] and [phone].  It can take 1-5 business days for a decision to come back

## 2022-05-17 NOTE — TELEPHONE ENCOUNTER
So just received from RN as pharmacy for patient regarding Tay Cruz is not covered it does require prior Auth. Can call [phone] for prior Auth. Key is:  [facility] [id number].

## 2022-05-18 LAB — ADRENOCORTICOTROPIC HORMONE: 16.3 PG/ML

## 2022-05-18 NOTE — TELEPHONE ENCOUNTER
Not sure what they mean by \"compendia condition\"? Either way we will just refer her to cardiology.

## 2022-05-18 NOTE — TELEPHONE ENCOUNTER
I am not sure I understand why they did not approve the request for Nexletol. She is already been through the statins and Zetia. There is no other classes of medications outside the injectables which she does not want to do.   Maybe have her see a lipid specialist through [facility] heart Dr. Marly Logan etc.

## 2022-05-18 NOTE — TELEPHONE ENCOUNTER
Not an approved compendia condition per insurance. You may call [phone] provider line to discuss decision or place a referral for patient. Please advise.

## 2022-05-19 ENCOUNTER — PATIENT MESSAGE (OUTPATIENT)
Dept: INTERNAL MEDICINE CLINIC | Facility: CLINIC | Age: 62
End: 2022-05-19

## 2022-05-19 ENCOUNTER — TELEPHONE (OUTPATIENT)
Dept: INTERNAL MEDICINE CLINIC | Facility: CLINIC | Age: 62
End: 2022-05-19

## 2022-05-19 DIAGNOSIS — R93.89 ABNORMAL CT OF THE CHEST: Primary | ICD-10-CM

## 2022-05-19 LAB
ALDOSTERONE: 13.9 NG/DL
METANEPHRINE: <0.1 NMOL/L
NORMETANEPHRINE: 0.37 NMOL/L

## 2022-05-19 NOTE — TELEPHONE ENCOUNTER
Pt informed referral entered for her to see Dr. Joaquim Rosas cardiologist, pt states she has his info.

## 2022-05-19 NOTE — TELEPHONE ENCOUNTER
From: Justice Else  To: Heather Edouard. Juan Francisco Pruitt MD  Sent: 5/19/2022 8:39 AM CDT  Subject: [address] [place] [address]    Someone from [address] [place] [address] contacted me for an appointment. I didnt know anything about it. Can you explain?     Emanuel Kimble

## 2022-05-19 NOTE — TELEPHONE ENCOUNTER
Spoke with patient that referral was for her to see cardiologist due to insurance denying medication Nexletol.

## 2022-05-20 LAB
TESTOSTERONE, FREE, S: 1.08 NG/DL
TESTOSTERONE, TOTAL, S: 40 NG/DL

## 2022-05-22 LAB
ESTRADIOL BY TMS: 24.2 PG/ML
ESTROGENS TOTAL CALCULATION: 69.5 PG/ML
ESTRONE BY TMS: 45.3 PG/ML

## 2022-05-31 ENCOUNTER — LAB ENCOUNTER (OUTPATIENT)
Dept: LAB | Facility: HOSPITAL | Age: 62
End: 2022-05-31
Attending: INTERNAL MEDICINE
Payer: COMMERCIAL

## 2022-05-31 DIAGNOSIS — R93.5 ABNORMAL CT OF THE ABDOMEN: ICD-10-CM

## 2022-05-31 PROCEDURE — 84244 ASSAY OF RENIN: CPT

## 2022-05-31 PROCEDURE — 36415 COLL VENOUS BLD VENIPUNCTURE: CPT

## 2022-06-03 LAB — RENIN ACTIVITY: 1.1 NG/ML/HR

## 2022-06-06 ENCOUNTER — LAB REQUISITION (OUTPATIENT)
Dept: SURGERY | Age: 62
End: 2022-06-06
Payer: COMMERCIAL

## 2022-06-06 ENCOUNTER — SURGERY CENTER DOCUMENTATION (OUTPATIENT)
Dept: SURGERY | Age: 62
End: 2022-06-06

## 2022-06-06 DIAGNOSIS — K63.5 POLYP OF COLON, UNSPECIFIED PART OF COLON, UNSPECIFIED TYPE: ICD-10-CM

## 2022-06-06 DIAGNOSIS — Z12.11 SPECIAL SCREENING FOR MALIGNANT NEOPLASMS, COLON: ICD-10-CM

## 2022-06-06 DIAGNOSIS — K57.30 DIVERTICULA OF COLON: ICD-10-CM

## 2022-06-06 PROCEDURE — 88305 TISSUE EXAM BY PATHOLOGIST: CPT | Performed by: INTERNAL MEDICINE

## 2022-06-15 ENCOUNTER — TELEPHONE (OUTPATIENT)
Dept: GASTROENTEROLOGY | Facility: CLINIC | Age: 62
End: 2022-06-15

## 2022-06-15 NOTE — TELEPHONE ENCOUNTER
I mailed out colonoscopy results letter to pt  Updated health maintenance  Entered into 5 yr CLN recall  Recall colon in 5 years per.  Colon done 6/06/2022    Rick Penn MD  [facility] Clinical Staff  GI staff: please place recall for colonoscopy in 5 years

## 2022-06-20 RX ORDER — METOPROLOL SUCCINATE 50 MG/1
TABLET, EXTENDED RELEASE ORAL
Qty: 45 TABLET | Refills: 0 | Status: SHIPPED | OUTPATIENT
Start: 2022-06-20 | End: 2022-06-20

## 2022-06-20 RX ORDER — METOPROLOL SUCCINATE 50 MG/1
TABLET, EXTENDED RELEASE ORAL
Qty: 45 TABLET | Refills: 0 | Status: SHIPPED | OUTPATIENT
Start: 2022-06-20 | End: 2022-07-25

## 2022-07-26 RX ORDER — METOPROLOL SUCCINATE 50 MG/1
TABLET, EXTENDED RELEASE ORAL
Qty: 45 TABLET | Refills: 0 | Status: SHIPPED | OUTPATIENT
Start: 2022-07-26 | End: 2022-10-03

## 2022-09-06 ENCOUNTER — TELEPHONE (OUTPATIENT)
Dept: GASTROENTEROLOGY | Facility: CLINIC | Age: 62
End: 2022-09-06

## 2022-09-06 ENCOUNTER — PATIENT MESSAGE (OUTPATIENT)
Dept: GASTROENTEROLOGY | Facility: CLINIC | Age: 62
End: 2022-09-06

## 2022-09-06 RX ORDER — OMEPRAZOLE 40 MG/1
40 CAPSULE, DELAYED RELEASE ORAL DAILY
Qty: 60 CAPSULE | Refills: 0 | Status: SHIPPED | OUTPATIENT
Start: 2022-09-06 | End: 2022-11-05

## 2022-09-06 NOTE — TELEPHONE ENCOUNTER
Dr. Nataliya Hall,    I spoke to patient who states since yesterday she has been experiencing upper right abdominal pain. Feels like a sharp/burning pain, comes and goes but feels steady. Denies issues with appetite, nausea, vomiting, fever. States she has felt chills. Has not taken anything for pain. Concerned about diverticulitis. Advised for now can try tylenol for pain, rest.    Please advise, thank you.

## 2022-09-06 NOTE — TELEPHONE ENCOUNTER
From: Jeremy Stahl  To: Genny Easton MD  Sent: 9/6/2022 8:10 AM CDT  Subject: Ibrahima Solitario    I'm having upper right abdominal pain that has been going on since yesterday. Is there any chance I can get in to see you?     Ibrahima Solitario

## 2022-09-06 NOTE — TELEPHONE ENCOUNTER
D/w patient:    -having a lot of stress, his mom is in hospice  -she has been eating out a lot  -will tx for gastritis with PPI x 60 days  -avoid NSAIDS  -she is not drinking etoh much  -if not improving, will consider Ab US RUQ and or EGD

## 2022-09-30 ENCOUNTER — HOSPITAL ENCOUNTER (OUTPATIENT)
Age: 62
Discharge: HOME | End: 2022-09-30
Payer: COMMERCIAL

## 2022-09-30 VITALS
RESPIRATION RATE: 18 BRPM | TEMPERATURE: 98 F | SYSTOLIC BLOOD PRESSURE: 154 MMHG | OXYGEN SATURATION: 98 % | DIASTOLIC BLOOD PRESSURE: 94 MMHG | HEART RATE: 121 BPM

## 2022-09-30 DIAGNOSIS — Z20.822 ENCOUNTER FOR SCREENING LABORATORY TESTING FOR COVID-19 VIRUS: Primary | ICD-10-CM

## 2022-09-30 DIAGNOSIS — H10.33 ACUTE BACTERIAL CONJUNCTIVITIS OF BOTH EYES: ICD-10-CM

## 2022-09-30 DIAGNOSIS — J01.00 ACUTE NON-RECURRENT MAXILLARY SINUSITIS: ICD-10-CM

## 2022-09-30 LAB — SARS-COV-2 RNA RESP QL NAA+PROBE: NOT DETECTED

## 2022-09-30 RX ORDER — AMOXICILLIN 875 MG/1
875 TABLET, COATED ORAL 2 TIMES DAILY
Qty: 20 TABLET | Refills: 0 | Status: SHIPPED | OUTPATIENT
Start: 2022-09-30 | End: 2022-10-10

## 2022-09-30 RX ORDER — POLYMYXIN B SULFATE AND TRIMETHOPRIM 1; 10000 MG/ML; [USP'U]/ML
1 SOLUTION OPHTHALMIC EVERY 4 HOURS
Qty: 10 ML | Refills: 0 | Status: SHIPPED | OUTPATIENT
Start: 2022-09-30 | End: 2022-10-05

## 2022-10-03 RX ORDER — METOPROLOL SUCCINATE 50 MG/1
TABLET, EXTENDED RELEASE ORAL
Qty: 45 TABLET | Refills: 1 | Status: SHIPPED | OUTPATIENT
Start: 2022-10-03 | End: 2022-12-10

## 2022-10-04 NOTE — TELEPHONE ENCOUNTER
Future Appointments   Date Time Provider Slade Stern   12/1/2022 11:40 AM [facility]   12/23/2022 10:30 AM Harini Singer MD [facility]         Please review; protocol failed/no protocol.      Requested Prescriptions   Pending Prescriptions Disp Refills    METOPROLOL SUCCINATE ER 50 MG Oral Tablet 24 Hr [Pharmacy Med Name: METOPROLOL SUCC ER 50 MG TAB] 45 tablet 0     Sig: TAKE ONE TABLET BY MOUTH DAILY AND TAKE 1/2 TABLET BY MOUTH EVERY NIGHT AT BEDTIME        Hypertensive Medications Protocol Failed - 10/3/2022  9:16 PM        Failed - Last BP reading less than 140/90     BP Readings from Last 1 Encounters:  09/30/22 : (!) 154/94                Passed - In person appointment in the past 12 or next 3 months       Recent Outpatient Visits              4 months ago [facility]glycemia    [facility], [address], Doris Edge MD    Office Visit    5 months ago Acute diverticulitis    [facility], [address], [place], Glendy Coffey MD    Office Visit    7 months ago Primary hypertension    [facility], [address] [facility], Sherry Braden MD    Office Visit    8 months ago COVID-19 virus infection    [facility], [address], [place], Rashel REYES MD    Telemedicine    8 months ago Sore throat    [address] (Indoor Clinic)    Nurse Only     Future Appointments         Provider Department Appt Notes    In 1 month [facility] [address] No changes    In 2 months Harini Singer MD [place], [place], [place] 9/14 1st mychart msg to reschedule due to provider-BA  6 month fu               Passed - CMP or BMP in past 6 months     Recent Results (from the past 4392 hour(s))   COMP METABOLIC PANEL (14)    Collection Time: 05/16/22  1:33 PM   Result Value Ref Range    Glucose 102 (H) 70 - 99 mg/dL    Sodium 139 136 - 145 mmol/L    Potassium 4.2 3.5 - 5.1 mmol/L Chloride 107 98 - 112 mmol/L    CO2 25.0 21.0 - 32.0 mmol/L    Anion Gap 7 0 - 18 mmol/L    BUN 16 7 - 18 mg/dL    Creatinine 1.01 0.55 - 1.02 mg/dL    BUN/CREA Ratio 15.8 10.0 - 20.0    Calcium, Total 9.9 8.5 - 10.1 mg/dL    Calculated Osmolality 289 275 - 295 mOsm/kg    GFR, Non- 59 (L) >=60    GFR, -American 68 >=60    ALT 24 13 - 56 U/L    AST 18 15 - 37 U/L    Alkaline Phosphatase 70 50 - 130 U/L    Bilirubin, Total 0.5 0.1 - 2.0 mg/dL    Total Protein 8.1 6.4 - 8.2 g/dL    Albumin 4.3 3.4 - 5.0 g/dL    Globulin  3.8 2.8 - 4.4 g/dL    A/G Ratio 1.1 1.0 - 2.0    Patient Fasting for CMP? Yes      *Note: Due to a large number of results and/or encounters for the requested time period, some results have not been displayed. A complete set of results can be found in Results Review.                  Passed - In person appointment or virtual visit in the past 6 months       Recent Outpatient Visits              4 months ago Hyperglycemia    [place], [facility], [address], Brayan Beckham MD    Office Visit    5 months ago Acute diverticulitis    [facility], [place], Kathyleen Labs, MD    Office Visit    7 months ago Primary hypertension    [facility], [facility], [address] [facility], Reese Silva MD    Office Visit    8 months ago COVID-19 virus infection    [facility], [facility], [address], [place] MD    Telemedicine    8 months ago Sore throat    [facility] ([address] [facility] [address])    Nurse Only     Future Appointments         Provider Department Appt Notes    In 1 month [facility] [address] No changes    In 2 months Bonita Galvez MD [place], [facility], [facility] 9/14 1st mychart msg to reschedule due to provider-BA  6 month fu               Passed - [facility] or GFRNAA > 50     GFR Evaluation  GFRNAA: 59 , resulted on 5/16/2022                   Recent Outpatient Visits 4 months ago Hyperglycemia    [facility], [address], Elizabeth Galan., MD    Office Visit    5 months ago Acute diverticulitis    [address], [place], Katlyn Hardwick MD    Office Visit    7 months ago Primary hypertension    [facility], [address] [facility], [address] [place] [address]., MD    Office Visit    8 months ago COVID-19 virus infection    [facility], [address] [facility] [facility] [address], [place], Tyler Pike MD    Telemedicine    8 months ago Sore throat    [address] [facility] (Indoor Clinic)    Nurse Only             Future Appointments         Provider Department Appt Notes    In 1 month [facility] [address] [facility] No changes    In 2 months MD [facility] Lin, [place], [employer] 9/14 1st mychart msg to reschedule due to provider-BA  6 month fu

## 2022-12-01 ENCOUNTER — PATIENT MESSAGE (OUTPATIENT)
Dept: INTERNAL MEDICINE CLINIC | Facility: CLINIC | Age: 62
End: 2022-12-01

## 2022-12-01 ENCOUNTER — HOSPITAL ENCOUNTER (OUTPATIENT)
Dept: MAMMOGRAPHY | Facility: HOSPITAL | Age: 62
Discharge: HOME | End: 2022-12-01
Attending: INTERNAL MEDICINE
Payer: COMMERCIAL

## 2022-12-01 DIAGNOSIS — Z12.31 BREAST CANCER SCREENING BY MAMMOGRAM: ICD-10-CM

## 2022-12-01 PROCEDURE — 77063 BREAST TOMOSYNTHESIS BI: CPT | Performed by: INTERNAL MEDICINE

## 2022-12-01 PROCEDURE — 77067 SCR MAMMO BI INCL CAD: CPT | Performed by: INTERNAL MEDICINE

## 2022-12-07 ENCOUNTER — TELEPHONE (OUTPATIENT)
Facility: CLINIC | Age: 62
End: 2022-12-07

## 2022-12-07 NOTE — TELEPHONE ENCOUNTER
Dr. Laxmi Delcid (office on call),    I spoke to patient who states she is having some upper abdominal pain under her sternum. Has been taking Pepcid 20mgas advised by Dr. Manisha Sheppard but feels it gives her heartburn instead of helping. Did not tolerate omeprazole. She is also a little constipated at times, takes metamucil, has 2 bm's a day, eating soft/bland foods. She wants to follow up with Dr. Manisha Sheppard to discuss this, concerned symptoms may worsen if not seen soon. She is ok with virtual visit. Is there anything that is recommended in the meantime.

## 2022-12-07 NOTE — TELEPHONE ENCOUNTER
GI on-call;  Patient contacted the office complaining of upper abdominal discomfort, burping and feeling of an air pocket. She had been prescribed famotidine by Dr. Td Bradshaw about 2 months ago and this seemed to resolve similar symptoms. She went out to the store and got Pepcid and took this yesterday and today but felt like it gave her some acid reflux. The patient reports sensation of constipation but she is moving her bowels 3 times a day, she is on fiber and had recently tried to increase her Metamucil to twice a day. This sounds to correlate with when her abdominal bloating and symptoms of air pocket started. She is still on her probiotic. Patient denies any cardiac symptoms, she has no shortness of breath, no diaphoresis, no chest pains or squeezing. She has no history of cardiac issues. Plan-patient will cut back down to once daily on the Metamucil, discussed possible other fiber supplement such as Benefiber which is a soluble fiber and may cause less gas. She will address this with Dr. Td Bradshaw when he returns. I also discussed possible other testing such as upper endoscopy will allow him to make this determination as to neck steps. Patient call the office with ongoing issues or problems.     Dr. Kolton Villatoro

## 2022-12-14 ENCOUNTER — HOSPITAL ENCOUNTER (OUTPATIENT)
Dept: ULTRASOUND IMAGING | Facility: HOSPITAL | Age: 62
Discharge: HOME | End: 2022-12-14
Attending: INTERNAL MEDICINE
Payer: COMMERCIAL

## 2022-12-14 ENCOUNTER — HOSPITAL ENCOUNTER (OUTPATIENT)
Dept: MAMMOGRAPHY | Facility: HOSPITAL | Age: 62
Discharge: HOME | End: 2022-12-14
Attending: INTERNAL MEDICINE
Payer: COMMERCIAL

## 2022-12-14 DIAGNOSIS — R92.8 ABNORMAL MAMMOGRAM: ICD-10-CM

## 2022-12-14 PROCEDURE — 76642 ULTRASOUND BREAST LIMITED: CPT | Performed by: INTERNAL MEDICINE

## 2022-12-14 PROCEDURE — 77065 DX MAMMO INCL CAD UNI: CPT | Performed by: INTERNAL MEDICINE

## 2022-12-14 PROCEDURE — 77061 BREAST TOMOSYNTHESIS UNI: CPT | Performed by: INTERNAL MEDICINE

## 2022-12-21 ENCOUNTER — LAB ENCOUNTER (OUTPATIENT)
Dept: LAB | Facility: HOSPITAL | Age: 62
End: 2022-12-21
Attending: INTERNAL MEDICINE
Payer: COMMERCIAL

## 2022-12-21 DIAGNOSIS — E78.2 MIXED HYPERLIPIDEMIA: ICD-10-CM

## 2022-12-21 LAB
ALBUMIN SERPL-MCNC: 3.8 G/DL (ref 3.4–5)
ALBUMIN/GLOB SERPL: 1 {RATIO} (ref 1–2)
ALP LIVER SERPL-CCNC: 81 U/L
ALT SERPL-CCNC: 24 U/L
ANION GAP SERPL CALC-SCNC: 7 MMOL/L (ref 0–18)
AST SERPL-CCNC: 13 U/L (ref 15–37)
BILIRUB SERPL-MCNC: 0.5 MG/DL (ref 0.1–2)
BUN BLD-MCNC: 22 MG/DL (ref 7–18)
BUN/CREAT SERPL: 20.8 (ref 10–20)
CALCIUM BLD-MCNC: 9.1 MG/DL (ref 8.5–10.1)
CHLORIDE SERPL-SCNC: 105 MMOL/L (ref 98–112)
CHOLEST SERPL-MCNC: 286 MG/DL (ref ?–200)
CO2 SERPL-SCNC: 27 MMOL/L (ref 21–32)
CREAT BLD-MCNC: 1.06 MG/DL
FASTING PATIENT LIPID ANSWER: YES
FASTING STATUS PATIENT QL REPORTED: YES
GFR SERPLBLD BASED ON 1.73 SQ M-ARVRAT: 59 ML/MIN/1.73M2 (ref 60–?)
GLOBULIN PLAS-MCNC: 3.9 G/DL (ref 2.8–4.4)
GLUCOSE BLD-MCNC: 113 MG/DL (ref 70–99)
HDLC SERPL-MCNC: 42 MG/DL (ref 40–59)
LDLC SERPL CALC-MCNC: 187 MG/DL (ref ?–100)
NONHDLC SERPL-MCNC: 244 MG/DL (ref ?–130)
OSMOLALITY SERPL CALC.SUM OF ELEC: 292 MOSM/KG (ref 275–295)
POTASSIUM SERPL-SCNC: 4.2 MMOL/L (ref 3.5–5.1)
PROT SERPL-MCNC: 7.7 G/DL (ref 6.4–8.2)
SODIUM SERPL-SCNC: 139 MMOL/L (ref 136–145)
TRIGL SERPL-MCNC: 293 MG/DL (ref 30–149)
VLDLC SERPL CALC-MCNC: 62 MG/DL (ref 0–30)

## 2022-12-21 PROCEDURE — 80061 LIPID PANEL: CPT

## 2022-12-21 PROCEDURE — 36415 COLL VENOUS BLD VENIPUNCTURE: CPT

## 2022-12-21 PROCEDURE — 80053 COMPREHEN METABOLIC PANEL: CPT

## 2022-12-23 ENCOUNTER — TELEPHONE (OUTPATIENT)
Dept: INTERNAL MEDICINE CLINIC | Facility: CLINIC | Age: 62
End: 2022-12-23

## 2022-12-23 ENCOUNTER — OFFICE VISIT (OUTPATIENT)
Dept: INTERNAL MEDICINE CLINIC | Facility: CLINIC | Age: 62
End: 2022-12-23
Payer: COMMERCIAL

## 2022-12-23 VITALS
WEIGHT: 207 LBS | HEART RATE: 91 BPM | DIASTOLIC BLOOD PRESSURE: 97 MMHG | SYSTOLIC BLOOD PRESSURE: 144 MMHG | TEMPERATURE: 99 F | BODY MASS INDEX: 32.49 KG/M2 | RESPIRATION RATE: 15 BRPM | OXYGEN SATURATION: 99 % | HEIGHT: 67 IN

## 2022-12-23 DIAGNOSIS — R73.9 HYPERGLYCEMIA: ICD-10-CM

## 2022-12-23 DIAGNOSIS — N28.9 RENAL INSUFFICIENCY: ICD-10-CM

## 2022-12-23 DIAGNOSIS — E04.1 THYROID NODULE: ICD-10-CM

## 2022-12-23 DIAGNOSIS — E78.2 MIXED HYPERLIPIDEMIA: ICD-10-CM

## 2022-12-23 DIAGNOSIS — Z71.85 VACCINE COUNSELING: ICD-10-CM

## 2022-12-23 DIAGNOSIS — I10 PRIMARY HYPERTENSION: Primary | ICD-10-CM

## 2022-12-23 PROBLEM — K90.41 NON-CELIAC GLUTEN SENSITIVITY: Status: ACTIVE | Noted: 2018-04-24

## 2022-12-23 PROCEDURE — 99214 OFFICE O/P EST MOD 30 MIN: CPT | Performed by: INTERNAL MEDICINE

## 2022-12-23 PROCEDURE — 3080F DIAST BP >= 90 MM HG: CPT | Performed by: INTERNAL MEDICINE

## 2022-12-23 PROCEDURE — 3008F BODY MASS INDEX DOCD: CPT | Performed by: INTERNAL MEDICINE

## 2022-12-23 PROCEDURE — 3077F SYST BP >= 140 MM HG: CPT | Performed by: INTERNAL MEDICINE

## 2022-12-23 RX ORDER — NIACIN 500 MG/1
500 TABLET, EXTENDED RELEASE ORAL NIGHTLY
Qty: 30 TABLET | Refills: 3 | Status: SHIPPED | OUTPATIENT
Start: 2022-12-23

## 2022-12-23 RX ORDER — METOPROLOL SUCCINATE 50 MG/1
50 TABLET, EXTENDED RELEASE ORAL 2 TIMES DAILY
Qty: 45 TABLET | Refills: 1 | Status: SHIPPED | OUTPATIENT
Start: 2022-12-23

## 2022-12-23 NOTE — PATIENT INSTRUCTIONS
ASSESSMENT/PLAN:   Primary hypertension  (primary encounter diagnosis) Not good control. Increase metoprolol 50 mg 2 times  Day. Call or RN only in 1 week to check BP. Careful with diet and excercise at least 30 minutes 3-4 times a week. Check blood pressures at different times on different days. Can purchase own blood pressure monitor. If not, check at local pharmacy. Bake foods more and grill occasionally. Avoid fried foods. No salt. Use other seasonings. Mixed hyperlipidemia Check blood in 3 months. DW pt. Of options. Try niacin 500 mg at night with aspirin 325 mg prior to niacin. Thyroid nodule    Vaccine counseling Up date. Hyperglycemia Check blood. Renal insufficiency No motrin, ibuprofen, advil, alleve, naprosyn  with these medications. Check blood in 2 weeks with 16 oz water prior to testing. Orders Placed This Encounter      Lipid Panel      Comp Metabolic Panel (14)      Hemoglobin A1C      Basic Metabolic Panel (8)      Meds This Visit:  Requested Prescriptions     Signed Prescriptions Disp Refills    Niacin ER, Antihyperlipidemic, 500 MG Oral Tab CR 30 tablet 3     Sig: Take 1 tablet (500 mg total) by mouth nightly. metoprolol succinate ER 50 MG Oral Tablet 24 Hr 45 tablet 1     Sig: Take 1 tablet (50 mg total) by mouth in the morning and 1 tablet (50 mg total) before bedtime. Imaging & Referrals:  None      RTC after 4-23 for medicare physical when returns from [place].

## 2022-12-23 NOTE — TELEPHONE ENCOUNTER
Patient wants an [facility] Annual px appt. She will be on Medicare at that time. The first available date in computer is June.   Call patient at [phone]

## 2023-01-10 ENCOUNTER — LAB ENCOUNTER (OUTPATIENT)
Dept: LAB | Facility: HOSPITAL | Age: 63
End: 2023-01-10
Attending: INTERNAL MEDICINE
Payer: COMMERCIAL

## 2023-01-10 DIAGNOSIS — N28.9 RENAL INSUFFICIENCY: ICD-10-CM

## 2023-01-10 LAB
ANION GAP SERPL CALC-SCNC: 9 MMOL/L (ref 0–18)
BUN BLD-MCNC: 16 MG/DL (ref 7–18)
BUN/CREAT SERPL: 16.7 (ref 10–20)
CALCIUM BLD-MCNC: 8.8 MG/DL (ref 8.5–10.1)
CHLORIDE SERPL-SCNC: 106 MMOL/L (ref 98–112)
CO2 SERPL-SCNC: 24 MMOL/L (ref 21–32)
CREAT BLD-MCNC: 0.96 MG/DL
FASTING STATUS PATIENT QL REPORTED: YES
GFR SERPLBLD BASED ON 1.73 SQ M-ARVRAT: 66 ML/MIN/1.73M2 (ref 60–?)
GLUCOSE BLD-MCNC: 114 MG/DL (ref 70–99)
OSMOLALITY SERPL CALC.SUM OF ELEC: 290 MOSM/KG (ref 275–295)
POTASSIUM SERPL-SCNC: 4.1 MMOL/L (ref 3.5–5.1)
SODIUM SERPL-SCNC: 139 MMOL/L (ref 136–145)

## 2023-01-10 PROCEDURE — 80048 BASIC METABOLIC PNL TOTAL CA: CPT

## 2023-01-10 PROCEDURE — 36415 COLL VENOUS BLD VENIPUNCTURE: CPT

## 2023-02-03 ENCOUNTER — MED REC SCAN ONLY (OUTPATIENT)
Dept: INTERNAL MEDICINE CLINIC | Facility: CLINIC | Age: 63
End: 2023-02-03

## 2023-02-03 ENCOUNTER — PATIENT MESSAGE (OUTPATIENT)
Dept: INTERNAL MEDICINE CLINIC | Facility: CLINIC | Age: 63
End: 2023-02-03

## 2023-02-03 NOTE — TELEPHONE ENCOUNTER
From: Natividad Olivares  To: Juan Cerna. Orquidea Kasper MD  Sent: 2/3/2023 2:58 PM CST  Subject: up coming surgery    I have torn my meniscus and looking to have surgery asap. My surgeon has asked that I have a pre operation appointment with my primary doctor. I would like to see you as soon as possible.     Thank you  Radha Saldivar

## 2023-02-04 ENCOUNTER — TELEPHONE (OUTPATIENT)
Dept: INTERNAL MEDICINE CLINIC | Facility: CLINIC | Age: 63
End: 2023-02-04

## 2023-02-04 DIAGNOSIS — R73.9 HYPERGLYCEMIA: ICD-10-CM

## 2023-02-04 DIAGNOSIS — I10 PRIMARY HYPERTENSION: ICD-10-CM

## 2023-02-04 DIAGNOSIS — M17.10 PRIMARY OSTEOARTHRITIS OF KNEE, UNSPECIFIED LATERALITY: ICD-10-CM

## 2023-02-04 DIAGNOSIS — E83.52 SERUM CALCIUM ELEVATED: ICD-10-CM

## 2023-02-04 DIAGNOSIS — E78.2 MIXED HYPERLIPIDEMIA: Primary | ICD-10-CM

## 2023-02-04 DIAGNOSIS — N28.9 RENAL INSUFFICIENCY: ICD-10-CM

## 2023-02-04 NOTE — TELEPHONE ENCOUNTER
Patient requesting a pre-op clearance for knee surgery scheduled for 2/8/2023, but there are no available appointments, please advise.

## 2023-02-06 NOTE — TELEPHONE ENCOUNTER
230 PM on 2-7-23 at [place] for pre op. There is some labs that she needs done before that we can do an office. Orders written for those labs plus also chest x-ray she does need appointment for labs and does need to be fasting. Chest x-ray she can go at the same time no appointment. We will do the urine and the EKG in the office. If anything shows up abnormal however that is good to be a concern because we are doing it so close to the presurgical date. Hopefully everything turns out okay.

## 2023-02-06 NOTE — TELEPHONE ENCOUNTER
Patient checking on status, would like to know if she will be able to get in before 02/08. Please advise.

## 2023-02-07 ENCOUNTER — OFFICE VISIT (OUTPATIENT)
Dept: INTERNAL MEDICINE CLINIC | Facility: CLINIC | Age: 63
End: 2023-02-07

## 2023-02-07 ENCOUNTER — LAB ENCOUNTER (OUTPATIENT)
Dept: LAB | Facility: HOSPITAL | Age: 63
End: 2023-02-07
Attending: INTERNAL MEDICINE
Payer: MEDICARE

## 2023-02-07 ENCOUNTER — HOSPITAL ENCOUNTER (OUTPATIENT)
Dept: GENERAL RADIOLOGY | Facility: HOSPITAL | Age: 63
Discharge: HOME | End: 2023-02-07
Attending: INTERNAL MEDICINE
Payer: MEDICARE

## 2023-02-07 VITALS
DIASTOLIC BLOOD PRESSURE: 86 MMHG | SYSTOLIC BLOOD PRESSURE: 152 MMHG | WEIGHT: 215 LBS | OXYGEN SATURATION: 98 % | HEIGHT: 67 IN | RESPIRATION RATE: 14 BRPM | HEART RATE: 89 BPM | TEMPERATURE: 98 F | BODY MASS INDEX: 33.74 KG/M2

## 2023-02-07 DIAGNOSIS — R73.9 HYPERGLYCEMIA: ICD-10-CM

## 2023-02-07 DIAGNOSIS — I10 PRIMARY HYPERTENSION: ICD-10-CM

## 2023-02-07 DIAGNOSIS — N28.9 RENAL INSUFFICIENCY: ICD-10-CM

## 2023-02-07 DIAGNOSIS — Z12.31 BREAST CANCER SCREENING BY MAMMOGRAM: ICD-10-CM

## 2023-02-07 DIAGNOSIS — G89.29 CHRONIC BILATERAL LOW BACK PAIN WITHOUT SCIATICA: ICD-10-CM

## 2023-02-07 DIAGNOSIS — M17.10 PRIMARY OSTEOARTHRITIS OF KNEE, UNSPECIFIED LATERALITY: ICD-10-CM

## 2023-02-07 DIAGNOSIS — E78.2 MIXED HYPERLIPIDEMIA: ICD-10-CM

## 2023-02-07 DIAGNOSIS — Z12.4 PAP SMEAR FOR CERVICAL CANCER SCREENING: ICD-10-CM

## 2023-02-07 DIAGNOSIS — I10 PRIMARY HYPERTENSION: Primary | ICD-10-CM

## 2023-02-07 DIAGNOSIS — Z71.85 VACCINE COUNSELING: ICD-10-CM

## 2023-02-07 DIAGNOSIS — G44.89 OTHER HEADACHE SYNDROME: ICD-10-CM

## 2023-02-07 DIAGNOSIS — L90.5: ICD-10-CM

## 2023-02-07 DIAGNOSIS — Z12.11 COLON CANCER SCREENING: ICD-10-CM

## 2023-02-07 DIAGNOSIS — M54.50 CHRONIC BILATERAL LOW BACK PAIN WITHOUT SCIATICA: ICD-10-CM

## 2023-02-07 DIAGNOSIS — Z87.19 HX OF DIVERTICULITIS OF COLON: ICD-10-CM

## 2023-02-07 DIAGNOSIS — E04.1 THYROID NODULE: ICD-10-CM

## 2023-02-07 DIAGNOSIS — Z00.00 ADULT GENERAL MEDICAL EXAMINATION: ICD-10-CM

## 2023-02-07 DIAGNOSIS — E83.52 SERUM CALCIUM ELEVATED: ICD-10-CM

## 2023-02-07 DIAGNOSIS — G47.09 OTHER INSOMNIA: ICD-10-CM

## 2023-02-07 DIAGNOSIS — E04.9 GOITER: ICD-10-CM

## 2023-02-07 DIAGNOSIS — K90.41 NON-CELIAC GLUTEN SENSITIVITY: ICD-10-CM

## 2023-02-07 DIAGNOSIS — Z01.818 PRE-OP EXAMINATION: ICD-10-CM

## 2023-02-07 DIAGNOSIS — Z90.49 S/P LAPAROSCOPIC-ASSISTED SIGMOIDECTOMY: ICD-10-CM

## 2023-02-07 PROBLEM — K57.92 ACUTE DIVERTICULITIS: Status: RESOLVED | Noted: 2022-03-25 | Resolved: 2023-02-07

## 2023-02-07 LAB
ALBUMIN SERPL-MCNC: 3.9 G/DL (ref 3.4–5)
ALBUMIN/GLOB SERPL: 1 {RATIO} (ref 1–2)
ALP LIVER SERPL-CCNC: 85 U/L
ALT SERPL-CCNC: 21 U/L
ANION GAP SERPL CALC-SCNC: 9 MMOL/L (ref 0–18)
ANTIBODY SCREEN: NEGATIVE
APPEARANCE: CLEAR
APTT PPP: 23.3 SECONDS (ref 23.3–35.6)
AST SERPL-CCNC: 14 U/L (ref 15–37)
BASOPHILS # BLD AUTO: 0.05 X10(3) UL (ref 0–0.2)
BASOPHILS NFR BLD AUTO: 0.7 %
BILIRUB SERPL-MCNC: 0.5 MG/DL (ref 0.1–2)
BILIRUBIN: NEGATIVE
BUN BLD-MCNC: 17 MG/DL (ref 7–18)
BUN/CREAT SERPL: 17.5 (ref 10–20)
CALCIUM BLD-MCNC: 9.4 MG/DL (ref 8.5–10.1)
CHLORIDE SERPL-SCNC: 107 MMOL/L (ref 98–112)
CHOLEST SERPL-MCNC: 275 MG/DL (ref ?–200)
CO2 SERPL-SCNC: 23 MMOL/L (ref 21–32)
CREAT BLD-MCNC: 0.97 MG/DL
DEPRECATED RDW RBC AUTO: 46.2 FL (ref 35.1–46.3)
EOSINOPHIL # BLD AUTO: 0.11 X10(3) UL (ref 0–0.7)
EOSINOPHIL NFR BLD AUTO: 1.4 %
ERYTHROCYTE [DISTWIDTH] IN BLOOD BY AUTOMATED COUNT: 14 % (ref 11–15)
EST. AVERAGE GLUCOSE BLD GHB EST-MCNC: 137 MG/DL (ref 68–126)
FASTING STATUS PATIENT QL REPORTED: YES
GFR SERPLBLD BASED ON 1.73 SQ M-ARVRAT: 65 ML/MIN/1.73M2 (ref 60–?)
GLOBULIN PLAS-MCNC: 3.9 G/DL (ref 2.8–4.4)
GLUCOSE (URINE DIPSTICK): NEGATIVE MG/DL
GLUCOSE BLD-MCNC: 112 MG/DL (ref 70–99)
HBA1C MFR BLD: 6.4 % (ref ?–5.7)
HCT VFR BLD AUTO: 40.5 %
HDLC SERPL-MCNC: 53 MG/DL (ref 40–59)
HGB BLD-MCNC: 13 G/DL
IMM GRANULOCYTES # BLD AUTO: 0.04 X10(3) UL (ref 0–1)
IMM GRANULOCYTES NFR BLD: 0.5 %
INR BLD: 0.92 (ref 0.85–1.16)
KETONES (URINE DIPSTICK): NEGATIVE MG/DL
LDLC SERPL CALC-MCNC: 174 MG/DL (ref ?–100)
LYMPHOCYTES # BLD AUTO: 2.35 X10(3) UL (ref 1–4)
LYMPHOCYTES NFR BLD AUTO: 30.9 %
MCH RBC QN AUTO: 29 PG (ref 26–34)
MCHC RBC AUTO-ENTMCNC: 32.1 G/DL (ref 31–37)
MCV RBC AUTO: 90.2 FL
MONOCYTES # BLD AUTO: 0.49 X10(3) UL (ref 0.1–1)
MONOCYTES NFR BLD AUTO: 6.4 %
MRSA DNA SPEC QL NAA+PROBE: NEGATIVE
MULTISTIX LOT#: ABNORMAL NUMERIC
NEUTROPHILS # BLD AUTO: 4.56 X10 (3) UL (ref 1.5–7.7)
NEUTROPHILS # BLD AUTO: 4.56 X10(3) UL (ref 1.5–7.7)
NEUTROPHILS NFR BLD AUTO: 60.1 %
NITRITE, URINE: NEGATIVE
NONHDLC SERPL-MCNC: 222 MG/DL (ref ?–130)
OCCULT BLOOD: NEGATIVE
OSMOLALITY SERPL CALC.SUM OF ELEC: 290 MOSM/KG (ref 275–295)
PH, URINE: 6.5 (ref 4.5–8)
PLATELET # BLD AUTO: 319 10(3)UL (ref 150–450)
POTASSIUM SERPL-SCNC: 4.2 MMOL/L (ref 3.5–5.1)
PROT SERPL-MCNC: 7.8 G/DL (ref 6.4–8.2)
PROTEIN (URINE DIPSTICK): NEGATIVE MG/DL
PROTHROMBIN TIME: 12.4 SECONDS (ref 11.6–14.8)
RBC # BLD AUTO: 4.49 X10(6)UL
RH BLOOD TYPE: POSITIVE
RH BLOOD TYPE: POSITIVE
SODIUM SERPL-SCNC: 139 MMOL/L (ref 136–145)
SPECIFIC GRAVITY: 1.02 (ref 1–1.03)
TRIGL SERPL-MCNC: 253 MG/DL (ref 30–149)
TSI SER-ACNC: 1.91 MIU/ML (ref 0.36–3.74)
URINE-COLOR: YELLOW
UROBILINOGEN,SEMI-QN: 0.2 MG/DL (ref 0–1.9)
VLDLC SERPL CALC-MCNC: 52 MG/DL (ref 0–30)
WBC # BLD AUTO: 7.6 X10(3) UL (ref 4–11)

## 2023-02-07 PROCEDURE — 81003 URINALYSIS AUTO W/O SCOPE: CPT | Performed by: INTERNAL MEDICINE

## 2023-02-07 PROCEDURE — 93000 ELECTROCARDIOGRAM COMPLETE: CPT | Performed by: INTERNAL MEDICINE

## 2023-02-07 PROCEDURE — 83036 HEMOGLOBIN GLYCOSYLATED A1C: CPT | Performed by: INTERNAL MEDICINE

## 2023-02-07 PROCEDURE — 71046 X-RAY EXAM CHEST 2 VIEWS: CPT | Performed by: INTERNAL MEDICINE

## 2023-02-07 PROCEDURE — 86900 BLOOD TYPING SEROLOGIC ABO: CPT

## 2023-02-07 PROCEDURE — 80061 LIPID PANEL: CPT | Performed by: INTERNAL MEDICINE

## 2023-02-07 PROCEDURE — 36415 COLL VENOUS BLD VENIPUNCTURE: CPT | Performed by: INTERNAL MEDICINE

## 2023-02-07 PROCEDURE — 87641 MR-STAPH DNA AMP PROBE: CPT

## 2023-02-07 PROCEDURE — 85730 THROMBOPLASTIN TIME PARTIAL: CPT

## 2023-02-07 PROCEDURE — 86850 RBC ANTIBODY SCREEN: CPT

## 2023-02-07 PROCEDURE — 85025 COMPLETE CBC W/AUTO DIFF WBC: CPT | Performed by: INTERNAL MEDICINE

## 2023-02-07 PROCEDURE — 99215 OFFICE O/P EST HI 40 MIN: CPT | Performed by: INTERNAL MEDICINE

## 2023-02-07 PROCEDURE — 80053 COMPREHEN METABOLIC PANEL: CPT | Performed by: INTERNAL MEDICINE

## 2023-02-07 PROCEDURE — 86901 BLOOD TYPING SEROLOGIC RH(D): CPT

## 2023-02-07 PROCEDURE — 85610 PROTHROMBIN TIME: CPT | Performed by: INTERNAL MEDICINE

## 2023-02-07 PROCEDURE — 84443 ASSAY THYROID STIM HORMONE: CPT | Performed by: INTERNAL MEDICINE

## 2023-02-07 PROCEDURE — 3077F SYST BP >= 140 MM HG: CPT | Performed by: INTERNAL MEDICINE

## 2023-02-07 PROCEDURE — 3008F BODY MASS INDEX DOCD: CPT | Performed by: INTERNAL MEDICINE

## 2023-02-07 PROCEDURE — 3079F DIAST BP 80-89 MM HG: CPT | Performed by: INTERNAL MEDICINE

## 2023-02-07 RX ORDER — METOPROLOL SUCCINATE 50 MG/1
50 TABLET, EXTENDED RELEASE ORAL 2 TIMES DAILY
Qty: 180 TABLET | Refills: 1 | Status: SHIPPED | OUTPATIENT
Start: 2023-02-07

## 2023-02-07 NOTE — PROGRESS NOTES
CMP Normal (electrolytes, sugar, kidney and liver functions),   Lipid (choilesterol) is slightly better but still high. Goal LDL should be less than 100. Goal triglycerides should be less than 150. Triglycerides are elevated when carbs are high. Low-carb diet helps. Careful with diet. Avoid red meat, shellfish, pork. Try not to boland foods. Lower carb diet. Exercise is most part at least 30 minutes 3-4 times a week sustained cardiovascular aerobic exercise getting that heart rate going and keeping it going for 30 minutes at a time. If cannot do 30 will start with 10 minutes of brisk walking is good at each week build up 5 minutes more. Recheck lipid in 3 months. Thyroid is good.

## 2023-02-07 NOTE — PATIENT INSTRUCTIONS
ASSESSMENT/PLAN:   Primary hypertension  (primary encounter diagnosis) Not good control Inrease metoprolol to 50 mg 2 times a day. All with BP's in 1 week. Careful with diet and excercise at least 30 minutes 3-4 times a week. Check blood pressures at different times on different days. Can purchase own blood pressure monitor. If not, check at local pharmacy. Bake foods more and grill occasionally. Avoid fried foods. No salt. Use other seasonings. Serum calcium elevated Awaiting blood. Goiter Check blood. Mixed hyperlipidemia Check blood. Thyroid nodule Check blood. Vaccine counseling Up to date. Colon cancer screening Up to date. Pap smear for cervical cancer screening Up to date. Hyperglycemia Stable. Breast cancer screening by mammogram Check FU  Mammogram 6-23. Continue self breast exam every month. Adult general medical examination heck urine. S/p laparoscopic-assisted sigmoidectomy     Scar of thoracic region    Non-celiac gluten sensitivity Avoid gluten. Hx of diverticulitis of colon    Chronic bilateral low back pain without sciatica    Pre-op examination  Current Outpatient Medications   Medication Sig Instructions Comments    Niacin ER, Antihyperlipidemic, 500 MG Oral Tab CR Take 1 tablet (500 mg total) by mouth nightly. Take as usual     metoprolol succinate ER 50 MG Oral Tablet 24 Hr Take 1 tablet (50 mg total) by mouth in the morning and 1 tablet (50 mg total) before bedtime. Take as usual     Coenzyme Q10 (COQ10) 200 MG Oral Cap  Hold 1 week prior. B Complex-C-Folic Acid Oral Tab Take by mouth. Hold 1 week prior to surgery. Multiple Vitamins-Iron (ONCE DAILY/IRON) Oral Tab Take by mouth 3 (three) times a week. Tuesday, Thursday and Saturday Hold 1 week prior to surgery. Cholecalciferol (VITAMIN D) 50 MCG (2000 UT) Oral Cap Take by mouth. Hold 1 week prior to surgery. NON FORMULARY cbd oil Hold 1 week prior to surgery.        No motrin, ibuprofen, advil, alleve, naprosyn  with these medications 1 week prior. Check EKG and urine. Hot flashes. Take niacin earlier in PM. Awaiting TSH blood. Orders Placed This Encounter      URINALYSIS, AUTO, W/O SCOPE      TSH W Reflex To Free T4      Hemoglobin A1C      Meds This Visit:  Requested Prescriptions     Signed Prescriptions Disp Refills    metoprolol succinate ER 50 MG Oral Tablet 24 Hr 180 tablet 1     Sig: Take 1 tablet (50 mg total) by mouth in the morning and 1 tablet (50 mg total) before bedtime. Imaging & Referrals:  ELECTROCARDIOGRAM, COMPLETE      Has set saumya't for physial 4-23.

## 2023-02-09 ENCOUNTER — PATIENT MESSAGE (OUTPATIENT)
Dept: INTERNAL MEDICINE CLINIC | Facility: CLINIC | Age: 63
End: 2023-02-09

## 2023-02-10 ENCOUNTER — TELEPHONE (OUTPATIENT)
Dept: INTERNAL MEDICINE CLINIC | Facility: CLINIC | Age: 63
End: 2023-02-10

## 2023-02-10 NOTE — TELEPHONE ENCOUNTER
Yvette Davidson from Dr. Laura Jones office calling to request medical clearance, labs and EKG be faxed to [phone].      [facility]: [phone]

## 2023-02-10 NOTE — TELEPHONE ENCOUNTER
Dr Alexis Almazan =see below and advice,thanks. Jonh Estrada, RN 2/9/2023  5:49 PM CST        ----- Message -----  From: Evelyn Larsen  Sent: 2/9/2023   8:45 AM CST  To: Em Rn Triage  Subject: Probiotic                                        Dr Alexis Almazan,  Would it be ok to keep taking my (Align) probiotic this week before my surgery?     Miriam Villa

## 2023-02-16 ENCOUNTER — TELEPHONE (OUTPATIENT)
Dept: INTERNAL MEDICINE CLINIC | Facility: CLINIC | Age: 63
End: 2023-02-16

## 2023-02-16 NOTE — TELEPHONE ENCOUNTER
Patient contacted (name and  of patient verified). VIKKI Rogers's message reviewed. Patient verbalizes understanding; denies further questions and agrees with plan of care. She explains Dr. Anni Santizo told her to call the office if her heart rate falls to 55 bpm or lower. Patient states she was getting worried that her heart rate was in the 60's, but states she will continue to take medication as prescribed, monitor heart rate, and call office with any updates if needed. Otherwise, she plans to follow-up in April.   Future Appointments   Date Time Provider Slade Stern   2023  2:30 PM Judy Leach MD [facility]

## 2023-02-16 NOTE — TELEPHONE ENCOUNTER
Dr. Jania Hutton is not currently in the office. Per last ov pcp increased metoprolol 50mg to bid. Pt HR is WNL and per RN has no symptoms. If HR is less than 60bpm and/or has symptoms contact office. Otherwise, Recommend cont dose and discussed with her pcp during next ov.

## 2023-02-16 NOTE — TELEPHONE ENCOUNTER
HI Dr. Ann Tejada,     Patient had a question regarding her metopolol. At her last visit she states you increased her dosage from 1 in morning and a half  tablet at night to 1 tablet in the morning and 1 tablet at night. Patient had surgery and is currently taking pain medication. She is stating her heart rate is consistently staying around 65, which she says for her is \"the lowest it has ever been\". Patient denies having any symptoms of dizziness at this time. Patient would like to know if she should continue the two tablets or go back to 1 in the morning and a half tablet at night until she is off the pain medication.

## 2023-03-24 ENCOUNTER — LAB ENCOUNTER (OUTPATIENT)
Dept: LAB | Facility: HOSPITAL | Age: 63
End: 2023-03-24
Attending: INTERNAL MEDICINE
Payer: MEDICARE

## 2023-03-24 DIAGNOSIS — R73.9 HYPERGLYCEMIA: ICD-10-CM

## 2023-03-24 DIAGNOSIS — E78.2 MIXED HYPERLIPIDEMIA: ICD-10-CM

## 2023-03-24 DIAGNOSIS — E04.1 THYROID NODULE: ICD-10-CM

## 2023-03-24 LAB
ALBUMIN SERPL-MCNC: 4 G/DL (ref 3.4–5)
ALBUMIN/GLOB SERPL: 1.1 {RATIO} (ref 1–2)
ALP LIVER SERPL-CCNC: 68 U/L
ALT SERPL-CCNC: 21 U/L
ANION GAP SERPL CALC-SCNC: 7 MMOL/L (ref 0–18)
AST SERPL-CCNC: 13 U/L (ref 15–37)
BILIRUB SERPL-MCNC: 0.5 MG/DL (ref 0.1–2)
BUN BLD-MCNC: 15 MG/DL (ref 7–18)
BUN/CREAT SERPL: 16.5 (ref 10–20)
CALCIUM BLD-MCNC: 9.6 MG/DL (ref 8.5–10.1)
CHLORIDE SERPL-SCNC: 105 MMOL/L (ref 98–112)
CHOLEST SERPL-MCNC: 253 MG/DL (ref ?–200)
CO2 SERPL-SCNC: 28 MMOL/L (ref 21–32)
CREAT BLD-MCNC: 0.91 MG/DL
EST. AVERAGE GLUCOSE BLD GHB EST-MCNC: 137 MG/DL (ref 68–126)
FASTING PATIENT LIPID ANSWER: YES
FASTING STATUS PATIENT QL REPORTED: YES
GFR SERPLBLD BASED ON 1.73 SQ M-ARVRAT: 70 ML/MIN/1.73M2 (ref 60–?)
GLOBULIN PLAS-MCNC: 3.8 G/DL (ref 2.8–4.4)
GLUCOSE BLD-MCNC: 118 MG/DL (ref 70–99)
HBA1C MFR BLD: 6.4 % (ref ?–5.7)
HDLC SERPL-MCNC: 52 MG/DL (ref 40–59)
LDLC SERPL CALC-MCNC: 147 MG/DL (ref ?–100)
NONHDLC SERPL-MCNC: 201 MG/DL (ref ?–130)
OSMOLALITY SERPL CALC.SUM OF ELEC: 292 MOSM/KG (ref 275–295)
POTASSIUM SERPL-SCNC: 4.1 MMOL/L (ref 3.5–5.1)
PROT SERPL-MCNC: 7.8 G/DL (ref 6.4–8.2)
SODIUM SERPL-SCNC: 140 MMOL/L (ref 136–145)
TRIGL SERPL-MCNC: 296 MG/DL (ref 30–149)
TSI SER-ACNC: 1.49 MIU/ML (ref 0.36–3.74)
VLDLC SERPL CALC-MCNC: 57 MG/DL (ref 0–30)

## 2023-03-24 PROCEDURE — 36415 COLL VENOUS BLD VENIPUNCTURE: CPT

## 2023-03-24 PROCEDURE — 80061 LIPID PANEL: CPT

## 2023-03-24 PROCEDURE — 83036 HEMOGLOBIN GLYCOSYLATED A1C: CPT

## 2023-03-24 PROCEDURE — 80053 COMPREHEN METABOLIC PANEL: CPT

## 2023-03-24 PROCEDURE — 84443 ASSAY THYROID STIM HORMONE: CPT

## 2023-03-24 NOTE — PROGRESS NOTES
CMP Normal (electrolytes,kidney and liver functions),   Lipid (choilesterol) is elevated but better than prior. Goal LDL should be less than 100. Goal triglycerides less than 150. High carbs raises triglycerides. Lower carbs helps lower triglycerides. Careful with diet. Avoid red meat, shellfish, pork. Try not to boland foods. Lower carb diet. Exercise is most part at least 30 minutes 3-4 times a week sustained cardiovascular aerobic exercise getting that heart rate going and keeping it going for 30 minutes at a time. If cannot do 30 will start with 10 minutes of brisk walking is good at each week build up 5 minutes more. Recheck lipid in 3 months. Orders written for future labs in 3 months. Thyroid is good. Hemoglobin A1c which is a 3-month average of sugars looks good. Goal is 6.5 or less.

## 2023-04-10 PROBLEM — H40.051 OCULAR HYPERTENSION OF RIGHT EYE: Status: ACTIVE | Noted: 2023-04-10

## 2023-04-10 PROBLEM — H25.811 COMBINED FORMS OF AGE-RELATED CATARACT OF RIGHT EYE: Status: ACTIVE | Noted: 2023-04-10

## 2023-04-10 PROBLEM — H04.123 DRY EYE SYNDROME OF BOTH EYES: Status: ACTIVE | Noted: 2023-04-10

## 2023-04-10 PROBLEM — G43.109 OCULAR MIGRAINE: Status: ACTIVE | Noted: 2023-04-10

## 2023-04-11 ENCOUNTER — MED REC SCAN ONLY (OUTPATIENT)
Dept: INTERNAL MEDICINE CLINIC | Facility: CLINIC | Age: 63
End: 2023-04-11

## 2023-05-31 ENCOUNTER — HOSPITAL ENCOUNTER (OUTPATIENT)
Dept: CT IMAGING | Facility: HOSPITAL | Age: 63
Discharge: HOME | End: 2023-05-31
Attending: INTERNAL MEDICINE
Payer: MEDICARE

## 2023-05-31 ENCOUNTER — TELEPHONE (OUTPATIENT)
Facility: CLINIC | Age: 63
End: 2023-05-31

## 2023-05-31 DIAGNOSIS — R10.33 PERIUMBILICAL ABDOMINAL PAIN: ICD-10-CM

## 2023-05-31 DIAGNOSIS — K57.92 ACUTE DIVERTICULITIS: ICD-10-CM

## 2023-05-31 DIAGNOSIS — R63.0 POOR APPETITE: ICD-10-CM

## 2023-05-31 DIAGNOSIS — K57.92 ACUTE DIVERTICULITIS: Primary | ICD-10-CM

## 2023-05-31 PROCEDURE — 74177 CT ABD & PELVIS W/CONTRAST: CPT | Performed by: INTERNAL MEDICINE

## 2023-05-31 NOTE — TELEPHONE ENCOUNTER
No issue to do CT again; I did review the CT at [facility]-- need to make sure there is no worsening of diverticulitis or abscess formation etc

## 2023-05-31 NOTE — TELEPHONE ENCOUNTER
Dr. Santi Alfred,    I spoke to patient who states she had a CT done at [facility] on 4/30/23 (in [facility] She wants to make sure there is no concern about doing them so soon apart. Please advise, thank you.

## 2023-05-31 NOTE — TELEPHONE ENCOUNTER
Dr. Yves Hernandez,    I spoke to patient who states she was in [place] a few weeks ago and went to ER for diverticulitis symptoms. They sent her home on antibiotics. (in care everywhere)    She felt ok after a week but since has not been feeling well. States her bowel movements have been normal but has been going 3-4 times a day which is not normal. Also notes having right side abdominal pain, more upper than lower. Pain/discomfort is constant but worse when eating 6-7/10. Unsure if she has a fever but doesn't feel well. Advised to stay on clear liquids for now. She is asking for follow up or if a procedure is needed. Please advise, thank you.

## 2023-05-31 NOTE — TELEPHONE ENCOUNTER
Pt calling to schedule appointment. First available 8/31/23 and placed on wait list.    Pt states that she was in the ER at [facility] in [place] in April for Diverticulitis. Pt currently complains of Abdominal pain and stomach issues. She states that she is having a Diverticulitis episode.   Please call

## 2023-05-31 NOTE — TELEPHONE ENCOUNTER
Contacted patient and reviewed note below. Patient verbalized understanding. Provided info to schedule CT.

## 2023-06-01 ENCOUNTER — TELEPHONE (OUTPATIENT)
Dept: GASTROENTEROLOGY | Facility: CLINIC | Age: 63
End: 2023-06-01

## 2023-07-09 RX ORDER — METOPROLOL SUCCINATE 50 MG/1
50 TABLET, EXTENDED RELEASE ORAL 2 TIMES DAILY
Qty: 180 TABLET | Refills: 1 | OUTPATIENT
Start: 2023-07-09

## 2023-07-09 NOTE — TELEPHONE ENCOUNTER
Duplicate request, previously addressed. Too soon     Disp Refills Start End    metoprolol succinate ER 50 MG Oral Tablet 24 Hr 180 tablet 1 2/7/2023     Sig - Route:  Take 1 tablet (50 mg total) by mouth in the morning and 1 tablet (50 mg total) before bedtime. - Oral    Sent to pharmacy as: Metoprolol Succinate ER 50 MG Oral Tablet Extended Release 24 Hour (Toprol XL)    E-Prescribing Status: Receipt confirmed by pharmacy (2/7/2023  3:40 PM CST)

## 2023-07-11 ENCOUNTER — PATIENT MESSAGE (OUTPATIENT)
Dept: INTERNAL MEDICINE CLINIC | Facility: CLINIC | Age: 63
End: 2023-07-11

## 2023-07-23 ENCOUNTER — PATIENT MESSAGE (OUTPATIENT)
Dept: INTERNAL MEDICINE CLINIC | Facility: CLINIC | Age: 63
End: 2023-07-23

## 2023-07-23 RX ORDER — METOPROLOL SUCCINATE 50 MG/1
50 TABLET, EXTENDED RELEASE ORAL 2 TIMES DAILY
Qty: 180 TABLET | Refills: 0 | Status: SHIPPED | OUTPATIENT
Start: 2023-07-23

## 2023-07-23 NOTE — TELEPHONE ENCOUNTER
Advised to schedule appt.     Future Appointments   Date Time Provider Slade Stern   11/16/2023  1:40 PM Farzana Lees MD [facility]

## 2023-07-23 NOTE — TELEPHONE ENCOUNTER
From: Yajaira Berrios  To: Ike Watts. Davon Veras MD  Sent: 7/23/2023 10:01 AM CDT  Subject: Orange staining    I have developed orange staining on the palm of my hand. I have searched the condition and see that it could be due to my thyroid or Carotenemia. Please advise.     Kandi Gatica

## 2023-07-23 NOTE — TELEPHONE ENCOUNTER
BP out of range for protocol. Please advise on refill request.    Requested Prescriptions     Pending Prescriptions Disp Refills    METOPROLOL SUCCINATE ER 50 MG Oral Tablet 24 Hr [Pharmacy Med Name: METOPROLOL SUCC ER 50 MG TAB] 180 tablet 1     Sig: TAKE ONE TABLET BY MOUTH EVERY MORNING AND TAKE ONE TABLET BY MOUTH EVERY NIGHT AT BEDTIME      Recent Visits  Date Type Provider Dept   02/07/23 Office Visit Harini Singer, MD [id number]   12/23/22 Office Visit MD Clau [facility]jia-[facility]   05/16/22 Office Visit MD Elizabeth [facility]   02/11/22 Office Visit Harini Singer, MD [facility]   Showing recent visits within past 540 days with a meds authorizing provider and meeting all other requirements  Future Appointments  No visits were found meeting these conditions.   Showing future appointments within next 150 days with a meds authorizing provider and meeting all other requirements     Requested Prescriptions   Pending Prescriptions Disp Refills    METOPROLOL SUCCINATE ER 50 MG Oral Tablet 24 Hr [Pharmacy Med Name: METOPROLOL SUCC ER 50 MG TAB] 180 tablet 1     Sig: TAKE ONE TABLET BY MOUTH EVERY MORNING AND TAKE ONE TABLET BY MOUTH EVERY NIGHT AT BEDTIME       Hypertensive Medications Protocol Failed - 7/22/2023  9:51 AM        Failed - Last BP reading less than 140/90     BP Readings from Last 1 Encounters:  02/07/23 : 152/86              Passed - In person appointment in the past 12 or next 3 months     Recent Outpatient Visits              5 months ago Primary hypertension    Christiano Culp, [address], Doris Edge MD    Office Visit    7 months ago Primary hypertension    Les Moss MD    Office Visit    1 year ago Hyperglycemia    Christiano Culp, [address], Doris Edge MD    Office Visit    1 year ago Acute diverticulitis Lynsey Galicia MD    Office Visit    1 year ago Primary hypertension    [facility], [address], Deepali Cardoza MD    Office Visit          Future Appointments         Provider Department Appt Notes    In 3 months Farzana Lees MD [address], [address], [facility] I have had a long standing appt for Aug 31 which you canceled. Now I cant get in until November. Passed - CMP or BMP in past 6 months     Recent Results (from the past 4392 hour(s))   COMP METABOLIC PANEL (14)    Collection Time: 03/24/23  8:27 AM   Result Value Ref Range    Glucose 118 (H) 70 - 99 mg/dL    Sodium 140 136 - 145 mmol/L    Potassium 4.1 3.5 - 5.1 mmol/L    Chloride 105 98 - 112 mmol/L    CO2 28.0 21.0 - 32.0 mmol/L    Anion Gap 7 0 - 18 mmol/L    BUN 15 7 - 18 mg/dL    Creatinine 0.91 0.55 - 1.02 mg/dL    BUN/CREA Ratio 16.5 10.0 - 20.0    Calcium, Total 9.6 8.5 - 10.1 mg/dL    Calculated Osmolality 292 275 - 295 mOsm/kg    eGFR-Cr 70 >=60 mL/min/1.73m2    ALT 21 13 - 56 U/L    AST 13 (L) 15 - 37 U/L    Alkaline Phosphatase 68 50 - 130 U/L    Bilirubin, Total 0.5 0.1 - 2.0 mg/dL    Total Protein 7.8 6.4 - 8.2 g/dL    Albumin 4.0 3.4 - 5.0 g/dL    Globulin  3.8 2.8 - 4.4 g/dL    A/G Ratio 1.1 1.0 - 2.0    Patient Fasting for CMP? Yes      *Note: Due to a large number of results and/or encounters for the requested time period, some results have not been displayed. A complete set of results can be found in Results Review.                Passed - In person appointment or virtual visit in the past 6 months     Recent Outpatient Visits              5 months ago Primary hypertension    Garfield Garcia MD    Office Visit    7 months ago Primary hypertension    [address], Brayan Hook MD    Office Visit    1 year ago Hyperglycemia    [facility], [address], Vamshi Danielson MD    Office Visit    1 year ago Acute diverticulitis    [facility], [address], [address] Janie Hayward MD    Office Visit    1 year ago Primary hypertension    [facility], [address] [facility] MD    Office Visit          Future Appointments         Provider Department Appt Notes    In 3 months MD Kathryn Weston, [address], [place] I have had a long standing appt for Aug 31 which you canceled. Now I cant get in until November. Passed - EGFRCR or GFRNAA > 50     GFR Evaluation  EGFRCR: 70 , resulted on 3/24/2023              Future Appointments         Provider Department Appt Notes    In 3 months MD Kathryn Weston, [address], [place] I have had a long standing appt for Aug 31 which you canceled. Now I cant get in until November.            Recent Outpatient Visits              5 months ago Primary hypertension    [facility], Vamshi Danielson MD    Office Visit    7 months ago Primary hypertension    Arnol Desai MD    Office Visit    1 year ago Hyperglycemia    [address] [facility], Vamshi Danielson MD    Office Visit    1 year ago Acute diverticulitis    Manisha Staples MD    Office Visit    1 year ago Primary hypertension    Kathryn Alexander, [address] [facility] MD    Office Visit

## 2023-07-27 ENCOUNTER — TELEPHONE (OUTPATIENT)
Dept: INTERNAL MEDICINE CLINIC | Facility: CLINIC | Age: 63
End: 2023-07-27

## 2023-07-27 NOTE — TELEPHONE ENCOUNTER
Spoke to patient cancelled her appointment for 7-28-23.  Please advice when you can add her to her schedule

## 2023-07-28 NOTE — TELEPHONE ENCOUNTER
Abraham for 1 PM August 8, 2023 at [place] office for acute visit. Would also physical for Medicare.

## 2023-07-31 ENCOUNTER — HOSPITAL ENCOUNTER (OUTPATIENT)
Dept: MAMMOGRAPHY | Facility: HOSPITAL | Age: 63
Discharge: HOME | End: 2023-07-31
Attending: INTERNAL MEDICINE
Payer: MEDICARE

## 2023-07-31 ENCOUNTER — HOSPITAL ENCOUNTER (OUTPATIENT)
Dept: ULTRASOUND IMAGING | Facility: HOSPITAL | Age: 63
Discharge: HOME | End: 2023-07-31
Attending: INTERNAL MEDICINE
Payer: MEDICARE

## 2023-07-31 DIAGNOSIS — R92.8 ABNORMALITY OF RIGHT BREAST ON SCREENING MAMMOGRAM: ICD-10-CM

## 2023-07-31 PROCEDURE — 76642 ULTRASOUND BREAST LIMITED: CPT | Performed by: INTERNAL MEDICINE

## 2023-07-31 PROCEDURE — 77061 BREAST TOMOSYNTHESIS UNI: CPT | Performed by: INTERNAL MEDICINE

## 2023-07-31 PROCEDURE — 77065 DX MAMMO INCL CAD UNI: CPT | Performed by: INTERNAL MEDICINE

## 2023-08-03 ENCOUNTER — OFFICE VISIT (OUTPATIENT)
Dept: INTERNAL MEDICINE CLINIC | Facility: CLINIC | Age: 63
End: 2023-08-03

## 2023-08-03 VITALS
WEIGHT: 216 LBS | DIASTOLIC BLOOD PRESSURE: 84 MMHG | BODY MASS INDEX: 33.9 KG/M2 | RESPIRATION RATE: 18 BRPM | TEMPERATURE: 97 F | HEIGHT: 67 IN | HEART RATE: 87 BPM | SYSTOLIC BLOOD PRESSURE: 146 MMHG

## 2023-08-03 DIAGNOSIS — I10 PRIMARY HYPERTENSION: ICD-10-CM

## 2023-08-03 DIAGNOSIS — Z71.85 VACCINE COUNSELING: ICD-10-CM

## 2023-08-03 DIAGNOSIS — E04.1 THYROID NODULE: ICD-10-CM

## 2023-08-03 DIAGNOSIS — R35.1 NOCTURIA: ICD-10-CM

## 2023-08-03 DIAGNOSIS — E78.2 MIXED HYPERLIPIDEMIA: Primary | ICD-10-CM

## 2023-08-03 DIAGNOSIS — R23.2 HOT FLASHES: ICD-10-CM

## 2023-08-03 DIAGNOSIS — R05.3 CHRONIC COUGH: ICD-10-CM

## 2023-08-03 LAB
APPEARANCE: CLEAR
BILIRUBIN: NEGATIVE
GLUCOSE (URINE DIPSTICK): NEGATIVE MG/DL
LEUKOCYTES: NEGATIVE
MULTISTIX LOT#: NORMAL NUMERIC
NITRITE, URINE: NEGATIVE
OCCULT BLOOD: NEGATIVE
PH, URINE: 5.5 (ref 4.5–8)
PROTEIN (URINE DIPSTICK): NEGATIVE MG/DL
SPECIFIC GRAVITY: 1.02 (ref 1–1.03)
URINE-COLOR: YELLOW
UROBILINOGEN,SEMI-QN: 0.2 MG/DL (ref 0–1.9)

## 2023-08-03 PROCEDURE — 81003 URINALYSIS AUTO W/O SCOPE: CPT | Performed by: INTERNAL MEDICINE

## 2023-08-03 PROCEDURE — 99215 OFFICE O/P EST HI 40 MIN: CPT | Performed by: INTERNAL MEDICINE

## 2023-08-03 RX ORDER — HYDROCHLOROTHIAZIDE 25 MG/1
25 TABLET ORAL DAILY
Qty: 30 TABLET | Refills: 2 | Status: SHIPPED | OUTPATIENT
Start: 2023-08-03

## 2023-08-03 NOTE — PATIENT INSTRUCTIONS
ASSESSMENT/PLAN:   Mixed hyperlipidemia  (primary encounter diagnosis)DW pt. of options. Not tolerate statins. Hold on the bembedoic acid for 1 week since starting new medicine for blood pressure. Then start. Check lipid in 3 months. Orders written. Thyroid nodule Stable. Nocturia. Hold any fluids after 6 PM.  Check urine. Vaccine counseling Up to date. Hot flashes Check blood. Hold meds. Primary hypertension Not good control. Add hydrochlorothiazide 1 tab in AM and check blood in > 1-2 weeks. RN only visit in 2 weeks to check BP. Careful with diet and excercise at least 30 minutes 3-4 times a week. Check blood pressures at different times on different days. Can purchase own blood pressure monitor. If not, check at local pharmacy. Bake foods more and grill occasionally. Avoid fried foods. No salt. Use other seasonings. Chronic cough ? Try loratidine 10 mg a day. Call if no better in 1 week. Hand DC. Resolved. Orders Placed This Encounter      Basic Metabolic Panel (8)      Quantiferon TB Plus      CBC With Differential With Platelet      Estrogens Fractionated, S [E]      Meds This Visit:  Requested Prescriptions     Signed Prescriptions Disp Refills    hydroCHLOROthiazide 25 MG Oral Tab 30 tablet 2     Sig: Take 1 tablet (25 mg total) by mouth daily. Imaging & Referrals:  None      RTC 3 months for physical and follow up.

## 2023-08-04 ENCOUNTER — TELEPHONE (OUTPATIENT)
Dept: INTERNAL MEDICINE CLINIC | Facility: CLINIC | Age: 63
End: 2023-08-04

## 2023-08-04 NOTE — TELEPHONE ENCOUNTER
PA initiated    Await outcome  Id#[id number] ph# [phone]  BEMPEDOIC ACID 180 MG TABS.  E78.2  Spoke with Jeff   [id number]  72 hours

## 2023-08-07 ENCOUNTER — TELEPHONE (OUTPATIENT)
Dept: INTERNAL MEDICINE CLINIC | Facility: CLINIC | Age: 63
End: 2023-08-07

## 2023-08-07 DIAGNOSIS — E78.2 MIXED HYPERLIPIDEMIA: Primary | ICD-10-CM

## 2023-08-07 NOTE — TELEPHONE ENCOUNTER
Medicare allows us to cover a drug only when it is a Part D drug. A Part D drug is one that is used for a ''medically accepted indication. '' A medically accepted indication means the use is approved by the Food and Drug Administration (FDA) OR the use is supported by one of the following accepted references: (1) [address] (2) Micromedex [address] Nexletol Tab 180mg is not FDA approved for your medical condition(s): Mixed hyperlipidemia, unspecified. These condition(s) are not supported by one of the accepted references. Therefore your drug is denied because it is not being used for a ''medically accepted indication. '' Reviewed by: Butch Foote

## 2023-08-07 NOTE — TELEPHONE ENCOUNTER
So looks like insurance will not cover her bempedoic acid. It is expensive.   May be to have her see a lipid specialist to [address]

## 2023-08-08 NOTE — TELEPHONE ENCOUNTER
Patient called back informed her of message below    Stated she will check with pharmacy on the cost.    Please call her when referral is approved.

## 2023-08-09 NOTE — TELEPHONE ENCOUNTER
Patient notified referral is authorized and patient asked for copy to be sent to [address] [place] [address] [address].

## 2023-08-10 ENCOUNTER — TELEPHONE (OUTPATIENT)
Dept: INTERNAL MEDICINE CLINIC | Facility: CLINIC | Age: 63
End: 2023-08-10

## 2023-08-17 ENCOUNTER — TELEPHONE (OUTPATIENT)
Facility: CLINIC | Age: 63
End: 2023-08-17

## 2023-08-17 ENCOUNTER — NURSE ONLY (OUTPATIENT)
Dept: INTERNAL MEDICINE CLINIC | Facility: CLINIC | Age: 63
End: 2023-08-17

## 2023-08-17 VITALS — HEART RATE: 96 BPM | SYSTOLIC BLOOD PRESSURE: 132 MMHG | DIASTOLIC BLOOD PRESSURE: 79 MMHG

## 2023-08-17 DIAGNOSIS — Z01.30 BP CHECK: Primary | ICD-10-CM

## 2023-08-17 NOTE — PROGRESS NOTES
BP readings at home    134/83  88    123/74  83    128/88  110    135/83  83    Patient presents for BP check. Name and  verified. Patient states she did take her BP meds this morning and w/ the new medication, she feels much better.      Via manual BP check  -  /79   HR 96

## 2023-08-17 NOTE — H&P
So readings look better on the blood pressure. Have her continue to monitor. Glad that she is feeling better.

## 2023-08-17 NOTE — TELEPHONE ENCOUNTER
Sent patient mychart with PCP response. Original message is from the in office nurse visit dated 8/17/23. Gabriel Barker MD1 hour ago (11:57 AM)       So readings look better on the blood pressure. Have her continue to monitor. Glad that she is feeling better.

## 2023-08-21 NOTE — TELEPHONE ENCOUNTER
Spoke to patient (verified Name and ) and relayed Dr. Mejia Eye message below. She will continue to monitor and log her blood pressure measurements as she has done so previously. She will call the office if she notices it trending up. Patient verbalized understanding and had no further questions at this time.

## 2023-08-21 NOTE — PROGRESS NOTES
Yamila HUMMEL    23 10:36 AM  Note  Spoke to patient (verified Name and ) and relayed Dr. Jae Adan message below. She will continue to monitor and log her blood pressure measurements as she has done so previously. She will call the office if she notices it trending up. Patient verbalized understanding and had no further questions at this time. No

## 2023-08-25 ENCOUNTER — TELEPHONE (OUTPATIENT)
Dept: INTERNAL MEDICINE CLINIC | Facility: CLINIC | Age: 63
End: 2023-08-25

## 2023-08-25 DIAGNOSIS — R23.2 HOT FLASHES: ICD-10-CM

## 2023-08-25 RX ORDER — HYDROCHLOROTHIAZIDE 25 MG/1
25 TABLET ORAL DAILY
Qty: 90 TABLET | Refills: 1 | Status: SHIPPED | OUTPATIENT
Start: 2023-08-25

## 2023-08-25 NOTE — TELEPHONE ENCOUNTER
Current Outpatient Medications:       hydroCHLOROthiazide 25 MG Oral Tab, Take 1 tablet (25 mg total) by mouth daily. , Disp: 30 tablet, Rfl: 2     Patient is requesting a new rx for a 90 day supply to be sent to [facility]

## 2023-10-19 RX ORDER — METOPROLOL SUCCINATE 50 MG/1
50 TABLET, EXTENDED RELEASE ORAL 2 TIMES DAILY
Qty: 180 TABLET | Refills: 2 | Status: SHIPPED | OUTPATIENT
Start: 2023-10-19

## 2023-10-19 NOTE — TELEPHONE ENCOUNTER
Refill passed per [employer], [facility] protocol. Basic Metabolic Panel  Order: [id number]  Component  Ref Range & Units 4/30/23  8:53 PM   Potassium, S  3.6 - 5.2 mmol/L 4.1   Sodium, S  135 - 145 mmol/L 138   Chloride, S  98 - 107 mmol/L 99   Bicarbonate, S  22 - 29 mmol/L 23   Anion Gap  7 - 15 16 High    BUN (Blood Urea Nitrogen), S  6.0 - 21.0 mg/dL 15.7   Creatinine  0.59 - 1.04 mg/dL 0.95   Estimated GFR (eGFR)  >=60 mL/min/BSA 66   Comment: Estimated GFR calculated using the 2021  CKD_EPI creatinine equation. Calcium, Total, S  8.8 - 10.2 mg/dL 9.9   Glucose, S  70 - 140 mg/dL 124   Resulting Agency [facility]       Requested Prescriptions   Pending Prescriptions Disp Refills    METOPROLOL SUCCINATE ER 50 MG Oral Tablet 24 Hr [Pharmacy Med Name: METOPROLOL SUCC ER 50 MG TAB] 180 tablet 0     Sig: TAKE 1 TABLET BY MOUTH TWICE A DAY       Hypertensive Medications Protocol Failed - 10/18/2023  5:04 AM        Failed - CMP or BMP in past 6 months     No results found for this or any previous visit (from the past 4392 hour(s)).             Passed - In person appointment in the past 12 or next 3 months     Recent Outpatient Visits              2 months ago BP check    [address], [address], [place]    Nurse Only    2 months ago Mixed hyperlipidemia    Rommel Ellis MD    Office Visit    8 months ago Primary hypertension    Rommel Ellis MD    Office Visit    10 months ago Primary hypertension    [address]    Office Visit    1 year ago Hyperglycemia    Rommel Ellis MD    Office Visit          Future Appointments         Provider Department Appt Notes    In 3 weeks Cameron Almazan MD [address], [facility] follow up**DUE FOR MEDICARE PX LAST PX 9-19-22**    In 4 weeks Hoa Jeronimo MD [address], [address], [facility] I have had a long standing appt for Aug 31 which you canceled. Now I cant get in until November. Passed - Last BP reading less than 140/90     BP Readings from Last 1 Encounters:  08/17/23 : 132/79              Passed - In person appointment or virtual visit in the past 6 months     Recent Outpatient Visits              2 months ago BP check    [address], [address], [address] Only    2 months ago Mixed hyperlipidemia    [address], [address], Elizabeth Montoya MD    Office Visit    8 months ago Primary hypertension    Jennifer Hicks, Elizabeth Montoya MD    Office Visit    10 months ago Primary hypertension    [facility], [facility], Deidre Peterson MD    Office Visit    1 year ago Hyperglycemia    [facility], [address], Elizabeth Montoya MD    Office Visit          Future Appointments         Provider Department Appt Notes    In 3 weeks Sky Perla MD [address], [address] [facility] follow up**DUE FOR MEDICARE [address]ST [address] 9-19-22**    In 4 weeks Hoa Jeronimo MD [address], [address], [facility] I have had a long standing appt for Aug 31 which you canceled. Now I cant get in until November. Passed - EGFRCR or GFRNAA > 50     GFR Evaluation  EGFRCR: 70 , resulted on 3/24/2023             Future Appointments         Provider Department Appt Notes    In 3 weeks Sky Perla MD [address], [address] [facility] follow up**DUE FOR MEDICARE [address] 9-19-22**    In 4 weeks Hoa Jeronimo MD [address], [address], [facility] I have had a long standing appt for Aug 31 which you canceled.   Now I cant get in until November.           Recent Outpatient Visits              2 months ago BP check    [address] [place], [place] [address]Cecilia    Nurse Only    2 months ago Mixed hyperlipidemia    [address] [place], [place] [address]Guy MD    Office Visit    8 months ago Primary hypertension    [address] [place], [place] [address]Guy MD    Office Visit    10 months ago Primary hypertension    [facility], Saran Ruby MD    Office Visit    1 year ago Hyperglycemia    [facility], [address], Guy Rodriguez MD    Office Visit

## 2023-10-21 ENCOUNTER — PATIENT MESSAGE (OUTPATIENT)
Dept: INTERNAL MEDICINE CLINIC | Facility: CLINIC | Age: 63
End: 2023-10-21

## 2023-10-23 NOTE — TELEPHONE ENCOUNTER
IDPH query requested; chart updated    [employer] message sent to patient in response to [employer] message received--->see message.

## 2023-11-01 ENCOUNTER — PATIENT MESSAGE (OUTPATIENT)
Dept: INTERNAL MEDICINE CLINIC | Facility: CLINIC | Age: 63
End: 2023-11-01

## 2023-11-01 DIAGNOSIS — M04.8 OTHER AUTOINFLAMMATORY SYNDROMES (HCC): ICD-10-CM

## 2023-11-01 DIAGNOSIS — M25.50 ARTHRALGIA, UNSPECIFIED JOINT: Primary | ICD-10-CM

## 2023-11-03 ENCOUNTER — HOSPITAL ENCOUNTER (OUTPATIENT)
Age: 63
Discharge: HOME | End: 2023-11-03
Payer: MEDICARE

## 2023-11-03 ENCOUNTER — NURSE TRIAGE (OUTPATIENT)
Facility: CLINIC | Age: 63
End: 2023-11-03

## 2023-11-03 ENCOUNTER — APPOINTMENT (OUTPATIENT)
Dept: GENERAL RADIOLOGY | Age: 63
End: 2023-11-03
Attending: NURSE PRACTITIONER
Payer: MEDICARE

## 2023-11-03 VITALS
SYSTOLIC BLOOD PRESSURE: 142 MMHG | TEMPERATURE: 97 F | HEART RATE: 87 BPM | DIASTOLIC BLOOD PRESSURE: 77 MMHG | OXYGEN SATURATION: 99 % | RESPIRATION RATE: 18 BRPM

## 2023-11-03 DIAGNOSIS — R07.89 CHEST TIGHTNESS: Primary | ICD-10-CM

## 2023-11-03 LAB
#MXD IC: 0.6 X10ˆ3/UL (ref 0.1–1)
BUN BLD-MCNC: 19 MG/DL (ref 7–18)
CHLORIDE BLD-SCNC: 106 MMOL/L (ref 98–112)
CO2 BLD-SCNC: 22 MMOL/L (ref 21–32)
CREAT BLD-MCNC: 1 MG/DL
EGFRCR SERPLBLD CKD-EPI 2021: 63 ML/MIN/1.73M2 (ref 60–?)
GLUCOSE BLD-MCNC: 110 MG/DL (ref 70–99)
HCT VFR BLD AUTO: 38.9 %
HCT VFR BLD CALC: 42 %
HGB BLD-MCNC: 12.6 G/DL
ISTAT IONIZED CALCIUM FOR CHEM 8: 1.08 MMOL/L (ref 1.12–1.32)
LYMPHOCYTES # BLD AUTO: 2.3 X10ˆ3/UL (ref 1–4)
LYMPHOCYTES NFR BLD AUTO: 30.8 %
MCH RBC QN AUTO: 28.3 PG (ref 26–34)
MCHC RBC AUTO-ENTMCNC: 32.4 G/DL (ref 31–37)
MCV RBC AUTO: 87.2 FL (ref 80–100)
MIXED CELL %: 7.7 %
NEUTROPHILS # BLD AUTO: 4.5 X10ˆ3/UL (ref 1.5–7.7)
NEUTROPHILS NFR BLD AUTO: 61.5 %
PLATELET # BLD AUTO: 328 X10ˆ3/UL (ref 150–450)
POTASSIUM BLD-SCNC: 4 MMOL/L (ref 3.6–5.1)
RBC # BLD AUTO: 4.46 X10ˆ6/UL
SODIUM BLD-SCNC: 141 MMOL/L (ref 136–145)
TROPONIN I BLD-MCNC: <0.02 NG/ML
WBC # BLD AUTO: 7.4 X10ˆ3/UL (ref 4–11)

## 2023-11-03 PROCEDURE — 84484 ASSAY OF TROPONIN QUANT: CPT | Performed by: NURSE PRACTITIONER

## 2023-11-03 PROCEDURE — 80047 BASIC METABLC PNL IONIZED CA: CPT | Performed by: NURSE PRACTITIONER

## 2023-11-03 PROCEDURE — 71046 X-RAY EXAM CHEST 2 VIEWS: CPT | Performed by: NURSE PRACTITIONER

## 2023-11-03 PROCEDURE — 93000 ELECTROCARDIOGRAM COMPLETE: CPT | Performed by: NURSE PRACTITIONER

## 2023-11-03 PROCEDURE — 85025 COMPLETE CBC W/AUTO DIFF WBC: CPT | Performed by: NURSE PRACTITIONER

## 2023-11-03 PROCEDURE — 99213 OFFICE O/P EST LOW 20 MIN: CPT | Performed by: NURSE PRACTITIONER

## 2023-11-03 NOTE — TELEPHONE ENCOUNTER
Action Requested: Summary for Provider     []  Critical Lab, Recommendations Needed  [] Need Additional Advice  []   FYI    []   Need Orders  [] Need Medications Sent to Pharmacy  []  Other     SUMMARY: Patient calling reporting possible side effects of medications (hydrochlorothiazide and metoprolol). Patient states Chest discomfort/tightness, bilateral ankle/knee pain, dry cough, calf and finger swelling. Patient was offered an office appointment for today. Patient declined prefers [place] location. Per patient will go to Immediate Care. Patient has follow up appointment scheduled for 11/10/2023 with provider.     Reason for call: Chest Pain  Onset: Approximately 2 weeks ago        Reason for Disposition   All other patients with chest pain (Exception: fleeting chest pain lasting a few seconds)    Protocols used: Chest Pain-A-[place]

## 2023-11-03 NOTE — DISCHARGE INSTRUCTIONS
As discussed, your work-up is reassuring. EKG normal.  Chest x-ray clear. Blood work does not show any acute kidney injury or dehydration or metabolic disturbance. I do recommend that you continue to take your hydrochlorothiazide, if you feel the need to, you may take half a tablet daily. However, you should not stop taking without consulting your PCP. I also do not recommend that you take it every other day. Your blood pressure was elevated in immediate care clinic today. Please monitor your blood pressure at home. Follow-up with your primary care doctor within the next 5 to 7 days. If you have any new or worsening symptoms of chest tightness, chest pain, dizziness, lightheadedness, palpitations, shortness of breath, lower extremity swelling, redness, tightness, please go to emergency room.

## 2023-11-03 NOTE — ED INITIAL ASSESSMENT (HPI)
Body aches all  joints, and  told her when she sleeps she breathes differently x 1month dry cough x 2weeks , patient started hydrochlorithiazide 25mg 2 months ago, chest tightness x 1-2 weeks.

## 2023-11-03 NOTE — TELEPHONE ENCOUNTER
3/24/23===CMP and lipid. 8/3/23==CBC,BMP,TB quantiferon  and estrogen fractional.   Patient is scheduled for MEDICARE physical on 11/10/23. Future Appointments   Date Time Provider Slade Leena   11/10/2023  1:00 PM Gopal Carrera MD [facility]   11/16/2023  1:40 PM Jayy Whittaker MD [facility]         From: Tonya Coronel  To: Warden Best. Chicho  Sent: 11/1/2023 10:51 AM CDT  Subject: Issues with joint pain    I am having issues with joint stiffness in my ankles and knees to the point where it is hard to do stairs. My fingers are swollen and i have not been able to do my aquatic exercises. My  has also said that I am breathing loudly at night. Thought I would let you know before my appointment next Friday. Also, do i need to have a blood draw before my appointment?   Jolynn Alaniz

## 2023-11-04 LAB
ATRIAL RATE: 87 BPM
P AXIS: 47 DEGREES
P-R INTERVAL: 166 MS
Q-T INTERVAL: 362 MS
QRS DURATION: 76 MS
QTC CALCULATION (BEZET): 435 MS
R AXIS: 21 DEGREES
T AXIS: 46 DEGREES
VENTRICULAR RATE: 87 BPM

## 2023-11-07 ENCOUNTER — LAB ENCOUNTER (OUTPATIENT)
Dept: LAB | Facility: HOSPITAL | Age: 63
End: 2023-11-07
Attending: INTERNAL MEDICINE
Payer: MEDICARE

## 2023-11-07 DIAGNOSIS — E78.2 MIXED HYPERLIPIDEMIA: ICD-10-CM

## 2023-11-07 DIAGNOSIS — M04.8 OTHER AUTOINFLAMMATORY SYNDROMES (HCC): ICD-10-CM

## 2023-11-07 DIAGNOSIS — M25.50 ARTHRALGIA, UNSPECIFIED JOINT: ICD-10-CM

## 2023-11-07 DIAGNOSIS — I10 PRIMARY HYPERTENSION: ICD-10-CM

## 2023-11-07 DIAGNOSIS — R23.2 HOT FLASHES: ICD-10-CM

## 2023-11-07 LAB
ALBUMIN SERPL-MCNC: 4.7 G/DL (ref 3.2–4.8)
ALBUMIN/GLOB SERPL: 1.6 {RATIO} (ref 1–2)
ALP LIVER SERPL-CCNC: 76 U/L
ALT SERPL-CCNC: 17 U/L
ANION GAP SERPL CALC-SCNC: 10 MMOL/L (ref 0–18)
AST SERPL-CCNC: 19 U/L (ref ?–34)
BASOPHILS # BLD AUTO: 0.04 X10(3) UL (ref 0–0.2)
BASOPHILS NFR BLD AUTO: 0.6 %
BILIRUB SERPL-MCNC: 0.6 MG/DL (ref 0.2–1.1)
BUN BLD-MCNC: 17 MG/DL (ref 9–23)
BUN/CREAT SERPL: 16.8 (ref 10–20)
CALCIUM BLD-MCNC: 9.7 MG/DL (ref 8.7–10.4)
CHLORIDE SERPL-SCNC: 103 MMOL/L (ref 98–112)
CHOLEST SERPL-MCNC: 292 MG/DL (ref ?–200)
CO2 SERPL-SCNC: 25 MMOL/L (ref 21–32)
CREAT BLD-MCNC: 1.01 MG/DL
CRP SERPL-MCNC: 2.9 MG/DL (ref ?–1)
DEPRECATED RDW RBC AUTO: 45 FL (ref 35.1–46.3)
EGFRCR SERPLBLD CKD-EPI 2021: 62 ML/MIN/1.73M2 (ref 60–?)
EOSINOPHIL # BLD AUTO: 0.36 X10(3) UL (ref 0–0.7)
EOSINOPHIL NFR BLD AUTO: 5.1 %
ERYTHROCYTE [DISTWIDTH] IN BLOOD BY AUTOMATED COUNT: 14 % (ref 11–15)
ERYTHROCYTE [SEDIMENTATION RATE] IN BLOOD: 117 MM/HR
FASTING PATIENT LIPID ANSWER: YES
FASTING STATUS PATIENT QL REPORTED: YES
GLOBULIN PLAS-MCNC: 3 G/DL (ref 2.8–4.4)
GLUCOSE BLD-MCNC: 108 MG/DL (ref 70–99)
HCT VFR BLD AUTO: 38.4 %
HDLC SERPL-MCNC: 42 MG/DL (ref 40–59)
HGB BLD-MCNC: 12.4 G/DL
IMM GRANULOCYTES # BLD AUTO: 0.02 X10(3) UL (ref 0–1)
IMM GRANULOCYTES NFR BLD: 0.3 %
LDLC SERPL CALC-MCNC: 189 MG/DL (ref ?–100)
LYMPHOCYTES # BLD AUTO: 2.22 X10(3) UL (ref 1–4)
LYMPHOCYTES NFR BLD AUTO: 31.6 %
MCH RBC QN AUTO: 28.6 PG (ref 26–34)
MCHC RBC AUTO-ENTMCNC: 32.3 G/DL (ref 31–37)
MCV RBC AUTO: 88.5 FL
MONOCYTES # BLD AUTO: 0.69 X10(3) UL (ref 0.1–1)
MONOCYTES NFR BLD AUTO: 9.8 %
NEUTROPHILS # BLD AUTO: 3.69 X10 (3) UL (ref 1.5–7.7)
NEUTROPHILS # BLD AUTO: 3.69 X10(3) UL (ref 1.5–7.7)
NEUTROPHILS NFR BLD AUTO: 52.6 %
NONHDLC SERPL-MCNC: 250 MG/DL (ref ?–130)
OSMOLALITY SERPL CALC.SUM OF ELEC: 288 MOSM/KG (ref 275–295)
PLATELET # BLD AUTO: 319 10(3)UL (ref 150–450)
POTASSIUM SERPL-SCNC: 4.1 MMOL/L (ref 3.5–5.1)
PROT SERPL-MCNC: 7.7 G/DL (ref 5.7–8.2)
RBC # BLD AUTO: 4.34 X10(6)UL
RHEUMATOID FACT SERPL-ACNC: 11 IU/ML (ref ?–14)
SODIUM SERPL-SCNC: 138 MMOL/L (ref 136–145)
TRIGL SERPL-MCNC: 310 MG/DL (ref 30–149)
TSI SER-ACNC: 1.67 MIU/ML (ref 0.55–4.78)
VLDLC SERPL CALC-MCNC: 66 MG/DL (ref 0–30)
WBC # BLD AUTO: 7 X10(3) UL (ref 4–11)

## 2023-11-07 PROCEDURE — 86140 C-REACTIVE PROTEIN: CPT

## 2023-11-07 PROCEDURE — 85025 COMPLETE CBC W/AUTO DIFF WBC: CPT

## 2023-11-07 PROCEDURE — 82671 ASSAY OF ESTROGENS: CPT

## 2023-11-07 PROCEDURE — 86038 ANTINUCLEAR ANTIBODIES: CPT

## 2023-11-07 PROCEDURE — 84443 ASSAY THYROID STIM HORMONE: CPT

## 2023-11-07 PROCEDURE — 80061 LIPID PANEL: CPT

## 2023-11-07 PROCEDURE — 86225 DNA ANTIBODY NATIVE: CPT

## 2023-11-07 PROCEDURE — 86480 TB TEST CELL IMMUN MEASURE: CPT

## 2023-11-07 PROCEDURE — 36415 COLL VENOUS BLD VENIPUNCTURE: CPT

## 2023-11-07 PROCEDURE — 86431 RHEUMATOID FACTOR QUANT: CPT

## 2023-11-07 PROCEDURE — 80053 COMPREHEN METABOLIC PANEL: CPT

## 2023-11-07 PROCEDURE — 85652 RBC SED RATE AUTOMATED: CPT

## 2023-11-08 LAB
M TB IFN-G CD4+ T-CELLS BLD-ACNC: 0.05 IU/ML
M TB TUBERC IFN-G BLD QL: NEGATIVE
M TB TUBERC IGNF/MITOGEN IGNF CONTROL: >10 IU/ML
QFT TB1 AG MINUS NIL: 0.08 IU/ML
QFT TB2 AG MINUS NIL: 0.08 IU/ML

## 2023-11-09 LAB
DSDNA IGG SERPL IA-ACNC: <0.6 IU/ML
ENA AB SER QL IA: 0.1 UG/L
ENA AB SER QL IA: NEGATIVE

## 2023-11-09 NOTE — PROGRESS NOTES
CMP Normal (electrolytes, kidney and liver functions),   Lipid (choilesterol) is worse. Goal triglycerides is < 150 and goal LDL < 100. Lower carbs. Helps lower triglycerides. ? Cholesterol lowering meds. Careful with diet. Avoid red meat, shellfish, pork. Try not to boland foods. Lower carb diet. Exercise is most part at least 30 minutes 3-4 times a week sustained cardiovascular aerobic exercise getting that heart rate going and keeping it going for 30 minutes at a time. If cannot do 30 will start with 10 minutes of brisk walking is good at each week build up 5 minutes more. Recheck in 3 months. Orders written. Add fenofibreat for triglycerides 134 a day? Thyroid is good. C reactive protein which is non specific marker is elevated. Connective tissue screen is negative. TB screening is negative.

## 2023-11-10 ENCOUNTER — OFFICE VISIT (OUTPATIENT)
Dept: INTERNAL MEDICINE CLINIC | Facility: CLINIC | Age: 63
End: 2023-11-10

## 2023-11-10 VITALS
RESPIRATION RATE: 16 BRPM | OXYGEN SATURATION: 97 % | SYSTOLIC BLOOD PRESSURE: 148 MMHG | BODY MASS INDEX: 33.43 KG/M2 | DIASTOLIC BLOOD PRESSURE: 86 MMHG | TEMPERATURE: 98 F | WEIGHT: 213 LBS | HEIGHT: 67 IN | HEART RATE: 84 BPM

## 2023-11-10 DIAGNOSIS — I10 PRIMARY HYPERTENSION: ICD-10-CM

## 2023-11-10 DIAGNOSIS — R19.7 DIARRHEA, UNSPECIFIED TYPE: ICD-10-CM

## 2023-11-10 DIAGNOSIS — R73.9 HYPERGLYCEMIA: Primary | ICD-10-CM

## 2023-11-10 DIAGNOSIS — Z78.0 POST-MENOPAUSAL: ICD-10-CM

## 2023-11-10 DIAGNOSIS — E78.2 MIXED HYPERLIPIDEMIA: ICD-10-CM

## 2023-11-10 DIAGNOSIS — M25.50 ARTHRALGIA, UNSPECIFIED JOINT: ICD-10-CM

## 2023-11-10 DIAGNOSIS — Z00.00 ENCOUNTER FOR ANNUAL HEALTH EXAMINATION: ICD-10-CM

## 2023-11-10 DIAGNOSIS — R05.2 SUBACUTE COUGH: ICD-10-CM

## 2023-11-10 DIAGNOSIS — Z71.85 VACCINE COUNSELING: ICD-10-CM

## 2023-11-10 DIAGNOSIS — E04.1 THYROID NODULE: ICD-10-CM

## 2023-11-14 LAB
ESTRADIOL: 17.8 PG/ML
ESTRONE: 59 PG/ML

## 2023-11-16 ENCOUNTER — OFFICE VISIT (OUTPATIENT)
Facility: CLINIC | Age: 63
End: 2023-11-16
Payer: MEDICARE

## 2023-11-16 VITALS
WEIGHT: 211 LBS | HEART RATE: 101 BPM | DIASTOLIC BLOOD PRESSURE: 86 MMHG | BODY MASS INDEX: 33.12 KG/M2 | HEIGHT: 67 IN | SYSTOLIC BLOOD PRESSURE: 183 MMHG

## 2023-11-16 DIAGNOSIS — K57.92 DIVERTICULITIS: Primary | ICD-10-CM

## 2023-11-16 DIAGNOSIS — K59.04 CHRONIC IDIOPATHIC CONSTIPATION: ICD-10-CM

## 2023-11-16 PROCEDURE — 99214 OFFICE O/P EST MOD 30 MIN: CPT | Performed by: INTERNAL MEDICINE

## 2023-11-16 NOTE — PROGRESS NOTES
[address] [facility]                                                                                                      Clinic Follow-up Visit    Chief Complaint   Patient presents with    Follow - Up     Check up. Subjective/HPI:   Roldan Gonzalez is a a(n) 57year old female w/ a history of sciatica, hx of colon surgery-sigmoidectomy in 2012 for diverticulitis, who presents for f u CT showing diverticulitis. -Patient currently denies any GI symptoms of nausea, vomiting, dyspepsia, dysphagia, hematemesis, abdominal pain, change in bowel habits, thin stools, hematochezia, or melena. Additionally there is no weight loss and no reported history of chest pain or shortness of breath.  -Was having inflammation on hands and legs, calves--->seeing rheumatologist  -moves bowels well but constipation issues over the summer    CT AP (5/2023)  CONCLUSION:   1. No acute finding. 2. Large amount of stool in the colon suggests constipation. Colonic diverticulosis. Post sigmoidectomy. 3. Stable incompletely characterized 1 cm right hepatic lesion supportive of a benign etiology. 4. Stable incompletely characterized 2 cm right adrenal nodule supportive of a benign adenoma. CT A/P 3/25/22    1. Acute colonic diverticulitis just proximal to the anastomotic staple line at previous sigmoidectomy. If not recently performed, follow-up colonoscopy or sigmoidoscopy recommended after treatment. 2. Incompletely characterized 2.3 cm right adrenal nodule. Statistically this likely represents adenoma. Follow-up nonemergent adrenal MRI recommended for further evaluation. Alternatively, a dedicated renal CT could be obtained in 6 months. 3. No major discrepancy with preliminary [facility] report. June 2022 C-scope  One small colon polyp removed.   Sigmoidectomy noted, with normal colo-colo anastomosis. Small internal hemorrhoids and mild pan-colonic diverticulosis. Wt Readings from Last 6 Encounters:   23 211 lb (95.7 kg)   11/10/23 213 lb (96.6 kg)   23 216 lb (98 kg)   23 215 lb (97.5 kg)   22 207 lb (93.9 kg)   22 206 lb 12.8 oz (93.8 kg)        History, Medications, Allergies, ROS:      Past Medical History:   Diagnosis Date    Acute bilateral low back pain without sciatica 2021    Colon polyp     repeat CLN IN     High blood pressure     Hyperlipemia     Torn meniscus 2023      Past Surgical History:   Procedure Laterality Date    ERIK LOCALIZATION WIRE 1 SITE RIGHT (CPT=19281)      REMOVE UTERUS AFTER         Family History   Problem Relation Age of Onset    Breast Cancer Mother 80    High Blood Pressure Mother     Diabetes Mother     High Cholesterol Mother     Heart Attack Mother     Cancer Father         Prostate    Cancer Paternal Grandmother         leukemia    Other (Other) Paternal Grandfather         Diverticulitis      Social History:   Social History     Socioeconomic History    Marital status:    Tobacco Use    Smoking status: Former     Types: Cigarettes     Quit date: 1999     Years since quittin.8     Passive exposure: Never    Smokeless tobacco: Never    Tobacco comments:     was more of a social smoker   Vaping Use    Vaping Use: Never used   Substance and Sexual Activity    Alcohol use: Yes     Alcohol/week: 2.0 standard drinks of alcohol     Types: 2 Glasses of wine per week     Comment: on wkends    Drug use: Never        Medications (Active prior to today's visit):  Current Outpatient Medications   Medication Sig Dispense Refill    metoprolol succinate ER 50 MG Oral Tablet 24 Hr Take 1 tablet (50 mg total) by mouth 2 (two) times daily. 180 tablet 2    Coenzyme Q10 (COQ10) 200 MG Oral Cap       B Complex-C-Folic Acid Oral Tab Take by mouth.       Multiple Vitamins-Iron (ONCE DAILY/IRON) Oral Tab Take by mouth 3 (three) times a week. Tuesday, Thursday and Saturday      Cholecalciferol (VITAMIN D) 50 MCG (2000 UT) Oral Cap Take by mouth. Allergies: Allergies   Allergen Reactions    Tetracycline RASH           Statins MYALGIA     Tried Lipitor    Wheat Gluten MYALGIA    Gluten Meal OTHER (SEE COMMENTS)     \"Debilitating\"  \"Debilitating\"      Losartan UNKNOWN    Other UNKNOWN     \"mycins\"    Azithromycin ITCHING    Ezetimibe DIARRHEA    Levaquin [Levofloxacin] RASH    Lisinopril Coughing    Omeprazole DIARRHEA     Sig diarrhea with omeprazole, no sob/cp    Oxytetracycline RASH    Oxytetracycline RASH       ROS:   CONSTITUTIONAL:  negative for fevers, chills  EYES:  negative for change in vision  RESPIRATORY:  negative for  shortness of breath  CARDIOVASCULAR:  negative for  chest pain  GASTROINTESTINAL:  see HPI  GENITOURINARY:  negative for dysuria  INTEGUMENT/BREAST:  SKIN:  negative for  rash  ALLERGIC/IMMUNOLOGIC:  negative for hay fever  ENDOCRINE:  negative for cold intolerance and heat intolerance  MUSCULOSKELETAL:  negative for  joint stiffness and joint swelling  BEHAVIOR/PSYCH:  negative for depressed mood    PHYSICAL EXAM:   Blood pressure (!) 183/86, pulse 101, height 5' 7\" (1.702 m), weight 211 lb (95.7 kg). Gen- Patient appears comfortable and in no acute discomfort  HEENT: the sclera appears anicteric, oropharynx clear, mucus membranes appear moist  CV- regular rate and rhythm, the extremities are warm and well perfused   Lung- Moves air well; No labored breathing  Abdomen- soft, non-tender exam in all quadrants without rigidity or guarding, non-distended, no abnormal bowel sounds noted, no masses are palpated  Skin- No jaundice  Ext: no cyanosis, clubbing or edema is evident. Neuro- Alert and oriented x4, and patient is having movement of all 4 extremities   Psych - appropriate, non-agitated    Labs/Imaging:     Patient's labs and imaging were reviewed and discussed with patient today. [facility] ASSESSMENT/PLAN:   Mardy Opitz is a a(n) 57year old female w/ a history of sciatica, hx of colon surgery-sigmoidectomy in 2012 for diverticulitis, who presents for f u CT showing diverticulitis. #Acute diverticulitis-resolved  #Hx of sigmoidectomy  #Hx of polyps (2022) -repeat CLN in 2027  #Adrenal nodule -fu with PCP    Overall patient doing well taking Metamucil few times a week, occasional constipation issues as noted on CAT scan during the summer 2023. Advised her to try milk of magnesia 1 to 2 tablespoons with plenty of water to see if this helps her evacuate her bowels at times better. She overall has no major complaints other than some cysts feeling of inflammatory condition in her arms and legs, she is seeing her rheumatologist for this. Over the counter milk of magnesium 1-2 tablespoons with plenty water  Continue metamucil  Avoid NSAIDS        Orders This Visit:  No orders of the defined types were placed in this encounter. Meds This Visit:  Requested Prescriptions      No prescriptions requested or ordered in this encounter       Imaging & Referrals:  None     4/28/2022    DAWN Byers MD  Pager: [phone]        This note was partially prepared using [address] [place] ib[employer]box voice recognition dictation software. As a result, errors may occur. When identified, these errors have been corrected.  While every attempt is made to correct errors during dictation, discrepancies may still exist.

## 2023-11-16 NOTE — PATIENT INSTRUCTIONS
Over the counter milk of magnesium 1-2 tablespoons with plenty water  Continue metamucil  Avoid NSAIDS

## 2023-12-06 ENCOUNTER — OFFICE VISIT (OUTPATIENT)
Dept: RHEUMATOLOGY | Facility: CLINIC | Age: 63
End: 2023-12-06

## 2023-12-06 VITALS
HEIGHT: 67 IN | SYSTOLIC BLOOD PRESSURE: 152 MMHG | WEIGHT: 206.5 LBS | DIASTOLIC BLOOD PRESSURE: 88 MMHG | HEART RATE: 95 BPM | BODY MASS INDEX: 32.41 KG/M2 | RESPIRATION RATE: 16 BRPM

## 2023-12-06 DIAGNOSIS — M06.4 INFLAMMATORY POLYARTHRITIS (HCC): Primary | ICD-10-CM

## 2023-12-06 DIAGNOSIS — R70.0 ELEVATED SED RATE: ICD-10-CM

## 2023-12-06 DIAGNOSIS — M54.50 CHRONIC BILATERAL LOW BACK PAIN WITHOUT SCIATICA: ICD-10-CM

## 2023-12-06 DIAGNOSIS — M79.10 MYALGIA: ICD-10-CM

## 2023-12-06 DIAGNOSIS — G89.29 CHRONIC BILATERAL LOW BACK PAIN WITHOUT SCIATICA: ICD-10-CM

## 2023-12-06 PROCEDURE — 99204 OFFICE O/P NEW MOD 45 MIN: CPT | Performed by: INTERNAL MEDICINE

## 2023-12-07 ENCOUNTER — HOSPITAL ENCOUNTER (OUTPATIENT)
Dept: GENERAL RADIOLOGY | Facility: HOSPITAL | Age: 63
Discharge: HOME | End: 2023-12-07
Attending: INTERNAL MEDICINE
Payer: MEDICARE

## 2023-12-07 ENCOUNTER — LAB ENCOUNTER (OUTPATIENT)
Dept: LAB | Facility: HOSPITAL | Age: 63
End: 2023-12-07
Attending: INTERNAL MEDICINE
Payer: MEDICARE

## 2023-12-07 DIAGNOSIS — R70.0 ELEVATED SED RATE: ICD-10-CM

## 2023-12-07 DIAGNOSIS — M79.10 MYALGIA: ICD-10-CM

## 2023-12-07 DIAGNOSIS — M06.4 INFLAMMATORY POLYARTHRITIS (HCC): ICD-10-CM

## 2023-12-07 LAB
CCP IGG SERPL-ACNC: 1.2 U/ML (ref 0–6.9)
CK SERPL-CCNC: 51 U/L
CRP SERPL-MCNC: 2.6 MG/DL (ref ?–1)
ERYTHROCYTE [SEDIMENTATION RATE] IN BLOOD: 99 MM/HR

## 2023-12-07 PROCEDURE — 72202 X-RAY EXAM SI JOINTS 3/> VWS: CPT | Performed by: INTERNAL MEDICINE

## 2023-12-07 PROCEDURE — 81374 HLA I TYPING 1 ANTIGEN LR: CPT

## 2023-12-07 PROCEDURE — 85652 RBC SED RATE AUTOMATED: CPT

## 2023-12-07 PROCEDURE — 86235 NUCLEAR ANTIGEN ANTIBODY: CPT

## 2023-12-07 PROCEDURE — 86200 CCP ANTIBODY: CPT

## 2023-12-07 PROCEDURE — 36415 COLL VENOUS BLD VENIPUNCTURE: CPT

## 2023-12-07 PROCEDURE — 82085 ASSAY OF ALDOLASE: CPT

## 2023-12-07 PROCEDURE — 82550 ASSAY OF CK (CPK): CPT

## 2023-12-07 PROCEDURE — 86038 ANTINUCLEAR ANTIBODIES: CPT

## 2023-12-07 PROCEDURE — 86140 C-REACTIVE PROTEIN: CPT

## 2023-12-08 LAB — ALDOLASE: 3.6 U/L

## 2023-12-11 LAB — NUCLEAR IGG TITR SER IF: NEGATIVE {TITER}

## 2023-12-12 LAB — ANTIHISTONE ABS: 0.4 UNITS

## 2023-12-14 LAB — HLA-B27: NEGATIVE

## 2024-01-11 ENCOUNTER — HOSPITAL ENCOUNTER (OUTPATIENT)
Dept: ULTRASOUND IMAGING | Facility: HOSPITAL | Age: 64
Discharge: HOME | End: 2024-01-11
Attending: INTERNAL MEDICINE
Payer: MEDICARE

## 2024-01-11 DIAGNOSIS — E04.1 THYROID NODULE: ICD-10-CM

## 2024-01-11 PROCEDURE — 76536 US EXAM OF HEAD AND NECK: CPT | Performed by: INTERNAL MEDICINE

## 2024-01-12 ENCOUNTER — TELEPHONE (OUTPATIENT)
Dept: INTERNAL MEDICINE CLINIC | Facility: CLINIC | Age: 64
End: 2024-01-12

## 2024-01-12 DIAGNOSIS — E04.1 THYROID NODULE: Primary | ICD-10-CM

## 2024-01-12 NOTE — TELEPHONE ENCOUNTER
Jami in Radiology calling to inform that they will need to get a new order for the patient to get a Ultrasound Thyroid Biopsy they do not do IR biopsy.

## 2024-01-24 ENCOUNTER — OFFICE VISIT (OUTPATIENT)
Dept: RHEUMATOLOGY | Facility: CLINIC | Age: 64
End: 2024-01-24
Payer: MEDICARE

## 2024-01-24 VITALS — RESPIRATION RATE: 16 BRPM | WEIGHT: 206.38 LBS | BODY MASS INDEX: 32.39 KG/M2 | HEIGHT: 67 IN

## 2024-01-24 DIAGNOSIS — R70.0 ELEVATED SED RATE: Primary | ICD-10-CM

## 2024-01-24 DIAGNOSIS — M54.50 CHRONIC BILATERAL LOW BACK PAIN WITHOUT SCIATICA: ICD-10-CM

## 2024-01-24 DIAGNOSIS — M79.10 MYALGIA: ICD-10-CM

## 2024-01-24 DIAGNOSIS — G89.29 CHRONIC BILATERAL LOW BACK PAIN WITHOUT SCIATICA: ICD-10-CM

## 2024-01-24 PROCEDURE — 99214 OFFICE O/P EST MOD 30 MIN: CPT | Performed by: INTERNAL MEDICINE

## 2024-01-24 NOTE — PROGRESS NOTES
Izabela Evans is a 63 year old female who presents for   Chief Complaint   Patient presents with    Results     Labs and Xray   .   HPI:     I had the pleasure of seeing Izabela Evans on 12/6/2023 for evaluation.     She is a pleasant 63 year old who has an elevated sed rate of 117mm/hr. And was referred by dr. Valentino.   She was on a new blood pressure medication in end of August.   No reaction til 1 month in. Around 11/7 - she had a sudden polyarthritis.   Then she got inflammed - her ankles statrted hurting, her knees hurt, and her hands and ankles were swollen.   She was having a polyarthritic reactionn and having a lot of pain. This was the worse falres she has had.   It lasted 3-4 day and she got labs during the flare. She didn't take steroids. She is drinking Three crista tea with turmeric.   She also recently had gluten at a reinon and had a piece of lasagna.      She often can't take many medications.   She is gluten sensitive and watching her gluten for the last 10 years. She has a feeling she might have had too much gluten.   No celiac disease. But over the year is she had pizza - 1-2 days wild she was having so much joint pain. It would attack her back and her joitns. Eating bread flares her.   So she always had flares after eating gluten.     She tore her right knee meniscus. Otherwise hs is fine.   She has been sick since Halloween. It started with a hacking cough.     Off all nsaids b/c of two bouts of diverticulits.   She had a left knee meniscal tear sx in 2/2023 - and took celebrex for 1 week but it was upseting her stomahc.   She's had covid twice. Once in 3/2020 in [place] and then again 2022 - in [place]. It was bad both times.     She has a patch of psoriasis on her left cheek - it's gotten worse lately - it's there for couple of years. Puts vitamin E oil. Hasn't seen dermatologist.   It flares when her stomach flare.     Has had back pain - has bulging disc but it can bother  her when her other joitns flare up.   No back pain with walking - more when she is still.     In the past medications would make her stomach upset or statins caused myalgis.   She was noted to do phyysical training to help lower her blood pressure and cholesterol.   She has been recovering from her left knee surgery this year and not as active. Had mensicus repair.     No headaches no jaw pain , no upper shoulder or hip pain, no vision issues.     Has had swirling of her eyes - and saw opthalmogoist - and dx with optic migraine - it went away. Lasted 10-15min. Not at the same time as the joitn pains - earlier this year it occureed.     1/24/2024  Still has elevated sed rate of 99mm/hr. And CRP of 2.6mg/dL.   She has pain in her one spot in her right side lower back with gluten.   Her rihg tknee is troubling her b/c she tore her meniscus   No pain   No having optic migraine - went to eye doctor on meonday - normal -   No headaches no jwa pain.     Wt Readings from Last 2 Encounters:   01/24/24 206 lb 6.4 oz (93.6 kg)   12/06/23 206 lb 8 oz (93.7 kg)     Body mass index is 32.33 kg/m².      Current Outpatient Medications   Medication Sig Dispense Refill    metoprolol succinate ER 50 MG Oral Tablet 24 Hr Take 1 tablet (50 mg total) by mouth 2 (two) times daily. 180 tablet 2    Coenzyme Q10 (COQ10) 200 MG Oral Cap       B Complex-C-Folic Acid Oral Tab Take by mouth.      Multiple Vitamins-Iron (ONCE DAILY/IRON) Oral Tab Take by mouth 3 (three) times a week. Tuesday, Thursday and Saturday      Cholecalciferol (VITAMIN D) 50 MCG (2000 UT) Oral Cap Take by mouth.        Past Medical History:   Diagnosis Date    Acute bilateral low back pain without sciatica 01/11/2021    Colon polyp 2022    repeat CLN IN 2027    High blood pressure     Hyperlipemia     Torn meniscus 02/2023      Past Surgical History:   Procedure Laterality Date    KNEE SCOPE,MED+LAT MENIS REPAIR Left 02/15/2023    ERIK LOCALIZATION WIRE 1 SITE RIGHT  (CPT=19281)      REMOVE UTERUS AFTER         Family History   Problem Relation Age of Onset    Breast Cancer Mother 86    High Blood Pressure Mother     Diabetes Mother     High Cholesterol Mother     Heart Attack Mother     Cancer Father         Prostate    Cancer Paternal Grandmother         leukemia    Other (Other) Paternal Grandfather         Diverticulitis      Social History:  Social History     Socioeconomic History    Marital status:    Tobacco Use    Smoking status: Former     Types: Cigarettes     Quit date: 1999     Years since quittin.0     Passive exposure: Never    Smokeless tobacco: Never    Tobacco comments:     was more of a social smoker   Vaping Use    Vaping Use: Never used   Substance and Sexual Activity    Alcohol use: Yes     Alcohol/week: 2.0 standard drinks of alcohol     Types: 2 Glasses of wine per week     Comment: on wkends    Drug use: Never      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        REVIEW OF SYSTEMS:   Review Of Systems:  Fatigue  Constitutional:No fever, no change in weight or appetitie  Derm: No rashes, no oral ulcers, no alopecia, no photosensitivity, no psoriasis  HEENT: No dry eyes, no dry mouth, no Raynaud's, no nasal ulcers, no parotid swelling, no neck pain, no jaw pain, no temple pain  Eyes: No visual changes,   CVS: No chest pain, no heart disease  RS: No SOB, no Cough, No Pleurtic pain,   GI: No nausea, no vomiiting, no abominal pain, no hx of ulcer, no gastritis, no heartburn, no dyshpagia, no BRBPR or melena  Hx of diverituclits   Hx of gluten senstiive - f/u with dr. sandra  : no dysuria, no hx of miscarriages, no DVT Hx, no hx of OCP,   Neuro: No numbness or tingling, no headache, no hx of seizures,   Psych: no hx of anxiety or depression  ENDO: no hx of thyroid disease, no hx of DM  Joint/Muscluskeltal: see HPI,   All other ROS are negative.     EXAM:   Resp 16   Ht 5' 7\" (1.702 m)   Wt 206 lb 6.4 oz (93.6 kg)    BMI 32.33 kg/m²   HEENT: Clear oropharynx, no oral ulcers, EOM intact, clear sclear, PERRLA, pleasant, no acute distress, no CAD,   No rashes  CVS: RRR, no murmurs  RS: CTAB, no crackles, no rhonchi  ABD: Soft Non tender, no HSM felt, BS positive  Joint exam:   No jaw tenderess.   no neck tendnerness, good ROM,   Can touch toes.   No joint tenderness or syvnoitis   Able to raise her arms up   Mild lower back tenderness   EXTREMITIES: no cyanosis, clubbing or edema  NEURO: intact touch, 5/5 ue and le strength    Component      Latest Ref Rng 11/7/2023   WBC      4.0 - 11.0 x10(3) uL 7.0    RBC      3.80 - 5.30 x10(6)uL 4.34    Hemoglobin      12.0 - 16.0 g/dL 12.4    Hematocrit      35.0 - 48.0 % 38.4    MCV      80.0 - 100.0 fL 88.5    MCH      26.0 - 34.0 pg 28.6    MCHC      31.0 - 37.0 g/dL 32.3    RDW-SD      35.1 - 46.3 fL 45.0    RDW      11.0 - 15.0 % 14.0    Platelet Count      150.0 - 450.0 10(3)uL 319.0    Prelim Neutrophil Abs      1.50 - 7.70 x10 (3) uL 3.69    Neutrophils Absolute      1.50 - 7.70 x10(3) uL 3.69    Lymphocytes Absolute      1.00 - 4.00 x10(3) uL 2.22    Monocytes Absolute      0.10 - 1.00 x10(3) uL 0.69    Eosinophils Absolute      0.00 - 0.70 x10(3) uL 0.36    Basophils Absolute      0.00 - 0.20 x10(3) uL 0.04    Immature Granulocyte Absolute      0.00 - 1.00 x10(3) uL 0.02    Neutrophils %      % 52.6    Lymphocytes %      % 31.6    Monocytes %      % 9.8    Eosinophils %      % 5.1    Basophils %      % 0.6    Immature Granulocyte %      % 0.3    Glucose      70 - 99 mg/dL 108 (H)    Sodium      136 - 145 mmol/L 138    Potassium      3.5 - 5.1 mmol/L 4.1    Chloride      98 - 112 mmol/L 103    Carbon Dioxide, Total      21.0 - 32.0 mmol/L 25.0    ANION GAP      0 - 18 mmol/L 10    BUN      9 - 23 mg/dL 17    CREATININE      0.55 - 1.02 mg/dL 1.01    BUN/CREATININE RATIO      10.0 - 20.0  16.8    CALCIUM      8.7 - 10.4 mg/dL 9.7    CALCULATED OSMOLALITY      275 - 295 mOsm/kg 288     EGFR      >=60 mL/min/1.73m2 62    ALT (SGPT)      10 - 49 U/L 17    AST (SGOT)      <=34 U/L 19    ALKALINE PHOSPHATASE      50 - 130 U/L 76    Total Bilirubin      0.2 - 1.1 mg/dL 0.6    PROTEIN, TOTAL      5.7 - 8.2 g/dL 7.7    Albumin      3.2 - 4.8 g/dL 4.7    Globulin      2.8 - 4.4 g/dL 3.0    A/G Ratio      1.0 - 2.0  1.6    Patient Fasting for CMP? Yes    Cholesterol, Total      <200 mg/dL 292 (H)    HDL Cholesterol      40 - 59 mg/dL 42    Triglycerides      30 - 149 mg/dL 310 (H)    LDL Cholesterol Calc      <100 mg/dL 189 (H)    VLDL      0 - 30 mg/dL 66 (H)    NON-HDL CHOLESTEROL      <130 mg/dL 250 (H)    Patient Fasting for Lipid? Yes    Quantiferon TB NIL      IU/mL 0.05    Quantiferon-TB1 Minus NIL      IU/mL 0.08    Quantiferon-TB2 Minus NIL      IU/mL 0.08    Quantiferon TB Mitogen minus NIL      IU/mL >10.00    Quantiferon TB Result      Negative  Negative    Expanded JAMAL Antibody Screen, IGG      <0.7 ug/l 0.10    Anti-dsDNA antibody      <10 IU/mL <0.6    Connective Tissue Disease Screen Interpretation      Negative  Negative    ESTRONE      0 - 125 pg/mL 59    Estradiol      pg/mL 17.8    SED RATE      0 - 30 mm/Hr 117 (H)    C-REACTIVE PROTEIN      <1.00 mg/dL 2.90 (H)    TSH      0.550 - 4.780 mIU/mL 1.671    RHEUMATOID FACTOR      <14 IU/mL 11         Component      Latest Ref Rng 12/7/2023   SED RATE      0 - 30 mm/Hr 99 (H)    C-REACTIVE PROTEIN      <1.00 mg/dL 2.60 (H)    CK      34 - 145 U/L 51    Aldolase      3.3 - 10.3 U/L 3.6    EDIL SCREEN      Negative  Negative    HLA-B27 Negative    C-Citrullinated Peptide IgG AB      0.0 - 6.9 U/mL 1.2    Antihistone Abs      0.0 - 0.9 Units 0.4       12/6/2023 - si joint xray     No evidence to suggest erosive arthropathy.     ASSESSMENT AND PLAN:   Izabela Evans is a 63 year old female who presents for   Chief Complaint   Patient presents with    Results     Labs and Xray     Inflammatory polyarthritis - sudden - in 8/2023 - , with  elevated sed rate > 100, myalgia and hx of gluten insetntiviity  and small psoraitc path   Possible lower back pain with spondyltisi symptoms   Was recently sick since 10/30 - and then had flare aroudn 11/7  - so potentionally she had a reactive arthritis - recent cough   - negative eval for  serongative RA, drug induced lupus, and spontylis - was negative   - her polyarthritis has improved - will repeat esr and crp   = with inflamamtory back symptoms - [id number] , and si joint xray    normalck and aldolase with hx of statin indulced myalgias - rule out myoistis   normal si joint xray -     Up to date on mammogram - every 6 months   Last colonoscopy was in 2023 -     2. Chronic lower back pain - from her 30-40s - bulging discs - L3-S -   Rule out sondylitis - gluten free helped her pback     3. Gluten sensitive -     Summary:  Check labs   If the sed rate is still elevated, then consider ct scan chest and abd pelvis       Will Cantrell MD  1/24/2024    Addendum  Sed rate is still 49mm/hr. - will check ct chest abd pelvis    Will Cantrell MD  2/1/2024   6:28 AM

## 2024-01-29 ENCOUNTER — LAB ENCOUNTER (OUTPATIENT)
Dept: LAB | Facility: HOSPITAL | Age: 64
End: 2024-01-29
Attending: INTERNAL MEDICINE
Payer: MEDICARE

## 2024-01-29 DIAGNOSIS — R70.0 ELEVATED SED RATE: ICD-10-CM

## 2024-01-29 LAB
CRP SERPL-MCNC: 1.4 MG/DL (ref ?–1)
ERYTHROCYTE [SEDIMENTATION RATE] IN BLOOD: 49 MM/HR

## 2024-01-29 PROCEDURE — 36415 COLL VENOUS BLD VENIPUNCTURE: CPT

## 2024-01-29 PROCEDURE — 85652 RBC SED RATE AUTOMATED: CPT

## 2024-01-29 PROCEDURE — 86140 C-REACTIVE PROTEIN: CPT

## 2024-02-28 NOTE — DISCHARGE INSTRUCTIONS
Procedure performed by      Biopsy/Aspiration of THYROID NODULE.    DISCHARGE INSTRUCTIONS    Thyroid Biopsy:  You may develop a sore throat after the procedure.  If necessary,                               throat lozenges may be used to relieve the discomfort.  Call your                               physician immediately if you experience increased swelling in your                                neck, difficulty breathing, or difficulty swallowing.    Additional Discharge Instructions for all Biopsies:                               DO NOT TAKE aspirin-containing products, Ibuprofen, Vitamin E, or                              blood thinning products for three (3) days after the procedure.  You may                             take Tylenol (1 or 2 tablets every 4-6 hours) for mild discomfort at the                             biopsy site.  Rest quietly for 24 hours, no physical activity.  Avoid                              straining, heavy lifting, or strenuous physical activity for approximately                             2 days.  Report any bleeding at aspiration/biopsy site, redness,                             swelling, odor, discharge, pain or fever that does not lessen after one                              day, to your physician.  Resume a regular diet.  Call your physician with                             questions or test results.  Also you may contact the Radiology Nurse                             at [phone] with any additional questions or concerns.    Other: ICE TO SITE IF NEEDED FOR DISCOMFORT    BRING THIS SHEET WITH YOU SHOULD YOU HAVE TO VISIT AN EMERGENCY ROOM OR SEE YOUR DOCTOR IN THE NEXT 24 HOURS.

## 2024-02-29 ENCOUNTER — HOSPITAL ENCOUNTER (OUTPATIENT)
Dept: CT IMAGING | Facility: HOSPITAL | Age: 64
Discharge: HOME | End: 2024-02-29
Attending: INTERNAL MEDICINE
Payer: MEDICARE

## 2024-02-29 DIAGNOSIS — R70.0 ELEVATED SED RATE: ICD-10-CM

## 2024-02-29 PROCEDURE — 71250 CT THORAX DX C-: CPT | Performed by: INTERNAL MEDICINE

## 2024-02-29 PROCEDURE — 74176 CT ABD & PELVIS W/O CONTRAST: CPT | Performed by: INTERNAL MEDICINE

## 2024-03-04 ENCOUNTER — HOSPITAL ENCOUNTER (OUTPATIENT)
Dept: ULTRASOUND IMAGING | Facility: HOSPITAL | Age: 64
Discharge: HOME | End: 2024-03-04
Attending: INTERNAL MEDICINE
Payer: MEDICARE

## 2024-03-04 DIAGNOSIS — E04.1 THYROID NODULE: ICD-10-CM

## 2024-03-04 PROCEDURE — 88173 CYTOPATH EVAL FNA REPORT: CPT | Performed by: INTERNAL MEDICINE

## 2024-03-04 PROCEDURE — 10005 FNA BX W/US GDN 1ST LES: CPT | Performed by: INTERNAL MEDICINE

## 2024-03-04 PROCEDURE — 88172 CYTP DX EVAL FNA 1ST EA SITE: CPT | Performed by: INTERNAL MEDICINE

## 2024-03-04 PROCEDURE — 88177 CYTP FNA EVAL EA ADDL: CPT | Performed by: INTERNAL MEDICINE

## 2024-03-04 NOTE — IMAGING NOTE
1247  Pt arrived to ultrasound room #4     1300  Scans taken by [facility]  ultrasound  sonographer     1250  History taken and as follows:  pt had biopsy in [place] previously , now had US  .      1251  Procedure explained questions answered.    1256  Consent verified and obtained      1307  scans reviewed by Dr. ENCINAS     Site marked RIGHT ISTHMUS NODULE     1309 Time out taken      1311  Area cleaned sterile towels  to site. Pathology  was  notified.      1315  Lidocaine 1% 10 milligrams per ml  from kit  was given 2 ml        FNA # 1 taken at  1319 with 22 g needle    FNA # 2 taken at 1321   with 22 g needle    FNA # 3 taken at  1325  with 22 g needle      1327  Procedure completed area re scanned . Area cleaned band aid to site ice pack to site.        1330  Post instructions given  verbal et written with AVS summary sheet provided to patient.  Also instructed patient to refrain from drinking or eating anything hot for several hours after biopsy  to prevent increase bleeding from occurring.    1335  Pt  discharged .

## 2024-07-17 RX ORDER — METOPROLOL SUCCINATE 50 MG/1
50 TABLET, EXTENDED RELEASE ORAL 2 TIMES DAILY
Qty: 180 TABLET | Refills: 1 | Status: SHIPPED | OUTPATIENT
Start: 2024-07-17

## 2024-07-17 NOTE — TELEPHONE ENCOUNTER
Please Review. Protocol Failed; No Protocol   BP Readings from Last 1 Encounters:   12/06/23 152/88     Requested Prescriptions   Pending Prescriptions Disp Refills    METOPROLOL SUCCINATE ER 50 MG Oral Tablet 24 Hr [Pharmacy Med Name: METOPROLOL SUCC ER 50 MG TAB] 180 tablet 2     Sig: TAKE 1 TABLET BY MOUTH TWICE A DAY       Hypertension Medications Protocol Failed - 7/15/2024  5:05 AM        Failed - Last BP reading less than 140/90     BP Readings from Last 1 Encounters:   12/06/23 152/88               Passed - CMP or BMP in past 12 months        Passed - In person appointment or virtual visit in the past 12 mos or appointment in next 3 mos     Recent Outpatient Visits              5 months ago Elevated sed rate    [facility], Will Whelan MD    Office Visit    7 months ago Inflammatory polyarthritis (HCC)    [facility], Will Whelan MD    Office Visit    8 months ago Diverticulitis    [facility], LAURITA Tucker MD    Office Visit    8 months ago Hyperglycemia    [facility] Guillermo Soriano Maggie E., MD    Office Visit    11 months ago BP check    [facility]    Nurse Only                      Passed - EGFRCR or GFRNAA > 50     GFR Evaluation  EGFRCR: 62 , resulted on 11/7/2023                   Recent Outpatient Visits              5 months ago Elevated sed rate    [facility], Will Whelan MD    Office Visit    7 months ago Inflammatory polyarthritis (HCC)    [facility]    Office Visit    8 months ago [facility]verticulitis    [facility]    Office Visit    8 months ago Hyperglycemia    [facility]  Guillermo Soriano Maggie E., MD    Office Visit    11 months ago BP check    [facility]    Nurse Only

## 2024-07-23 PROBLEM — H43.811 POSTERIOR VITREOUS DETACHMENT OF RIGHT EYE: Status: ACTIVE | Noted: 2024-07-23

## 2024-08-15 ENCOUNTER — PATIENT MESSAGE (OUTPATIENT)
Dept: INTERNAL MEDICINE CLINIC | Facility: CLINIC | Age: 64
End: 2024-08-15

## 2024-08-16 NOTE — TELEPHONE ENCOUNTER
From: Izabela Evans  To: Maegan Valentino  Sent: 8/15/2024 8:42 PM CDT  Subject: vaccine    I received my pneumonia vaccine on 8/15/2024 (Prevnar 20).    Izabela Evans

## 2024-09-05 ENCOUNTER — HOSPITAL ENCOUNTER (OUTPATIENT)
Dept: BONE DENSITY | Age: 64
Discharge: HOME | End: 2024-09-05
Attending: INTERNAL MEDICINE
Payer: MEDICARE

## 2024-09-05 DIAGNOSIS — Z78.0 POST-MENOPAUSAL: ICD-10-CM

## 2024-09-05 PROCEDURE — 77080 DXA BONE DENSITY AXIAL: CPT | Performed by: INTERNAL MEDICINE

## 2025-01-20 NOTE — TELEPHONE ENCOUNTER
Please review; protocol failed/No Protocol    Last Office Visit: 11/10/2023  Sent Marcandi message to patient to schedule an appointment.     Requested Prescriptions   Pending Prescriptions Disp Refills    METOPROLOL SUCCINATE ER 50 MG Oral Tablet 24 Hr [Pharmacy Med Name: METOPROLOL SUCC ER 50 MG TAB] 180 tablet 1     Sig: TAKE 1 TABLET BY MOUTH 2 TIMES A DAY       Hypertension Medications Protocol Failed - 1/20/2025  5:18 PM        Failed - CMP or BMP in past 12 months        Failed - Last BP reading less than 140/90     BP Readings from Last 1 Encounters:   12/06/23 152/88               Failed - In person appointment or virtual visit in the past 12 mos or appointment in next 3 mos     Recent Outpatient Visits              12 months ago [facility]    [facility]    1 year ago Inflammatory polyarthritis [facility]    [facility]    1 year ago [facility]verticul[facility]    [facility] Visit    1 year ago Hyperglycemia    [facility] [place] Gu[facility] Soriano Maggie E., MD    Office Visit    1 year ago BP check    [facility] [place]    Nurse Only                      Failed - EGFRCR or GFRNAA > 50     GFR Evaluation            Passed - Medication is active on med list             Recent Outpatient Visits              12 months ago Elevated sed rate    [facility]    Office Visit    1 year ago Inflammatory polyarthritis (HCC)    [facility]    [facility]    1 year ago [facility]verticulitis    [facility]    Office Visit    1 year ago Hyperglycemia     [facility] [place] Guillermo Soriano Maggie E., MD    Office Visit    1 year ago BP check    [facility]    Nurse Only

## 2025-01-21 RX ORDER — METOPROLOL SUCCINATE 50 MG/1
50 TABLET, EXTENDED RELEASE ORAL 2 TIMES DAILY
Qty: 180 TABLET | Refills: 0 | OUTPATIENT
Start: 2025-01-21

## 2025-01-21 RX ORDER — METOPROLOL SUCCINATE 50 MG/1
50 TABLET, EXTENDED RELEASE ORAL 2 TIMES DAILY
Qty: 180 TABLET | Refills: 0 | Status: SHIPPED | OUTPATIENT
Start: 2025-01-21 | End: 2025-01-24

## 2025-01-21 NOTE — TELEPHONE ENCOUNTER
Erx denied, patient due for routine appt, may refill once after they make appt for physical. Has not seen me in 1 yr. And has no upcoming saumya'ts.

## 2025-01-21 NOTE — TELEPHONE ENCOUNTER
Patient is very upset she want to be seen sooner than June or July  She had an appointment for February and we cancelled it and is not fair for her to wait she refuses to make another appointment until you fit her in February

## 2025-01-21 NOTE — TELEPHONE ENCOUNTER
We can put her at a 2:00 to 3:00 physical for Medicare if we can move the 130 up to 1:00?  On 1-24-25 if possible. At [place].

## 2025-01-23 PROBLEM — K57.92 DIVERTICULITIS: Status: RESOLVED | Noted: 2022-03-25 | Resolved: 2025-01-23

## 2025-01-23 PROBLEM — Z12.4 PAP SMEAR FOR CERVICAL CANCER SCREENING: Status: RESOLVED | Noted: 2021-07-29 | Resolved: 2025-01-23

## 2025-01-24 ENCOUNTER — OFFICE VISIT (OUTPATIENT)
Dept: INTERNAL MEDICINE CLINIC | Facility: CLINIC | Age: 65
End: 2025-01-24

## 2025-01-24 VITALS
WEIGHT: 217.19 LBS | OXYGEN SATURATION: 100 % | BODY MASS INDEX: 34.09 KG/M2 | SYSTOLIC BLOOD PRESSURE: 148 MMHG | HEART RATE: 86 BPM | DIASTOLIC BLOOD PRESSURE: 82 MMHG | HEIGHT: 67 IN | TEMPERATURE: 98 F

## 2025-01-24 DIAGNOSIS — L90.5: ICD-10-CM

## 2025-01-24 DIAGNOSIS — Z00.00 ADULT GENERAL MEDICAL EXAMINATION: Primary | ICD-10-CM

## 2025-01-24 DIAGNOSIS — H25.811 COMBINED FORMS OF AGE-RELATED CATARACT OF RIGHT EYE: ICD-10-CM

## 2025-01-24 DIAGNOSIS — E78.2 MIXED HYPERLIPIDEMIA: ICD-10-CM

## 2025-01-24 DIAGNOSIS — K59.04 CHRONIC IDIOPATHIC CONSTIPATION: ICD-10-CM

## 2025-01-24 DIAGNOSIS — G44.89 OTHER HEADACHE SYNDROME: ICD-10-CM

## 2025-01-24 DIAGNOSIS — Z12.11 COLON CANCER SCREENING: ICD-10-CM

## 2025-01-24 DIAGNOSIS — E04.1 THYROID NODULE: ICD-10-CM

## 2025-01-24 DIAGNOSIS — Z87.19 HX OF DIVERTICULITIS OF COLON: ICD-10-CM

## 2025-01-24 DIAGNOSIS — E83.52 SERUM CALCIUM ELEVATED: ICD-10-CM

## 2025-01-24 DIAGNOSIS — H40.051 OCULAR HYPERTENSION OF RIGHT EYE: ICD-10-CM

## 2025-01-24 DIAGNOSIS — G43.109 OCULAR MIGRAINE: ICD-10-CM

## 2025-01-24 DIAGNOSIS — Z71.85 VACCINE COUNSELING: ICD-10-CM

## 2025-01-24 DIAGNOSIS — H04.123 DRY EYE SYNDROME OF BOTH EYES: ICD-10-CM

## 2025-01-24 DIAGNOSIS — Z12.31 BREAST CANCER SCREENING BY MAMMOGRAM: ICD-10-CM

## 2025-01-24 DIAGNOSIS — Z90.49 S/P LAPAROSCOPIC-ASSISTED SIGMOIDECTOMY: ICD-10-CM

## 2025-01-24 DIAGNOSIS — Z00.00 ENCOUNTER FOR ANNUAL HEALTH EXAMINATION: ICD-10-CM

## 2025-01-24 DIAGNOSIS — H43.811 POSTERIOR VITREOUS DETACHMENT OF RIGHT EYE: ICD-10-CM

## 2025-01-24 DIAGNOSIS — E04.9 GOITER: ICD-10-CM

## 2025-01-24 DIAGNOSIS — K90.41 NON-CELIAC GLUTEN SENSITIVITY: ICD-10-CM

## 2025-01-24 DIAGNOSIS — G47.09 OTHER INSOMNIA: ICD-10-CM

## 2025-01-24 DIAGNOSIS — R73.9 HYPERGLYCEMIA: ICD-10-CM

## 2025-01-24 DIAGNOSIS — I10 PRIMARY HYPERTENSION: ICD-10-CM

## 2025-01-24 LAB
APPEARANCE: CLEAR
BILIRUBIN: NEGATIVE
GLUCOSE (URINE DIPSTICK): NEGATIVE MG/DL
KETONES (URINE DIPSTICK): NEGATIVE MG/DL
LEUKOCYTES: NEGATIVE
MULTISTIX LOT#: NORMAL NUMERIC
NITRITE, URINE: NEGATIVE
OCCULT BLOOD: NEGATIVE
PH, URINE: 5.5 (ref 4.5–8)
PROTEIN (URINE DIPSTICK): NEGATIVE MG/DL
SPECIFIC GRAVITY: 1.01 (ref 1–1.03)
URINE-COLOR: YELLOW
UROBILINOGEN,SEMI-QN: 0.2 MG/DL (ref 0–1.9)

## 2025-01-24 PROCEDURE — 99499 UNLISTED E&M SERVICE: CPT | Performed by: INTERNAL MEDICINE

## 2025-01-24 PROCEDURE — G0439 PPPS, SUBSEQ VISIT: HCPCS | Performed by: INTERNAL MEDICINE

## 2025-01-24 PROCEDURE — 99497 ADVNCD CARE PLAN 30 MIN: CPT | Performed by: INTERNAL MEDICINE

## 2025-01-24 PROCEDURE — 81003 URINALYSIS AUTO W/O SCOPE: CPT | Performed by: INTERNAL MEDICINE

## 2025-01-24 RX ORDER — METOPROLOL SUCCINATE 50 MG/1
TABLET, EXTENDED RELEASE ORAL
Qty: 270 TABLET | Refills: 0 | Status: SHIPPED | OUTPATIENT
Start: 2025-01-24

## 2025-01-24 RX ORDER — PHENTERMINE HYDROCHLORIDE 37.5 MG/1
37.5 TABLET ORAL
COMMUNITY
Start: 2024-09-20

## 2025-01-24 NOTE — PATIENT INSTRUCTIONS
ASSESSMENT/PLAN:     Encounter Diagnoses   Name Primary?      Patient Active Problem List   Diagnosis    Hypertension Higher. discussed with patient of options.  Increase metoprolol.  Since just filled metoprolol for 90-day supply.  Nurse only visit in 1 week to bring and check machine.  Call in 1 week later.Careful with diet and excercise at least 30 minutes 3-4 times a week. Check blood pressures at different times on different days. Can purchase own blood pressure monitor. If not, check at local pharmacy. Bake foods more and grill occasionally. Avoid fried foods. No salt. Use other seasonings.        Other headache syndrome Stable.       Other insomnia Improved.       Chronic lower back pain Stable.       Serum calcium elevated Check blood.       Goiter Check US 3-4-25. Check blood.       Mixed hyperlipidemia Check blood.       Hx of diverticulitis of colon  Diverticulosis is the condition of having small pouches protruding from the wall of the colon. These pouches are extremely common among Americans, for example, where about 10 percent of people over age 40, half of those older than 60, and two-thirds over age 80 have  them. Diverticulosis itself is really not a problem, as the pouches themselves are harmless and rarely cause symptoms. However, the situation becomes more serious if the pouches become infected from, for example, stool getting trapped in the pouch. If infection occurs, the condition is called diverticulitis. Diverticulitis is more serious because infection can lead to other problems. Diverticulosis leads to diverticulitis in about 15-20 percent of cases.  Researchers think a diet low in fiber is to blame for a high incidence of diverticulosis. Fiber is important because it helps keep stool soft and bulky so it can pass easily through the colon. Without enough fiber stool becomes hard, which creates pressure in the colon as the muscles strain to move the stool. This pressure is what causes  the parts of the wall of the colon to pop out  into pouches.    Most people with diverticulosis don’t know they have it unless they have studies of the colon done for other reasons and the doctor notices the pouches. But some people do have signs like mild cramps, bloating, or constipation. People whose diverticulosis develops into diverticulitis (this is a very rare occurrence) have symptoms of abdominal pain (usually in the lower left side), fever, nausea, vomiting, diarrhea or constipation, and chills. Symptoms of diverticulosis and diverticulitis are similar to other conditions, such as appendicitis, ovarian cyst, peptic ulcer, Crohn’s disease, and irritable bowel syndrome - so the doctor may do tests such as x-rays, ultrasound, or endoscopy to make the right diagnosis. Prompt treatment for diverticulitis is important because infection can cause complications like abscess (an infected area that can destroy tissue); perforation of the colon, which can let the infection leak out into the abdominal cavity; and peritonitis, an infection in the abdominal cavity that can be very serious and requires immediate medical attention. Treatment usually involves two steps: antibiotics to clear up the infection, and diet restrictions while the colon heals. Antibiotics: The doctor usually prescribes a 7 to 10- day course of antibiotics, and the doctor’s office will probably check with you daily to make sure the infection is not getting worse (if it is, you may need to check into the hospital for more aggressive antibiotic treatment).    Diet therapy: Until the colon heals, you’ll need to be on a low fiber diet. After the infection is gone, the doctor will want you to switch to a high fiber diet. The new diet will not get rid of existing pouches in the colon, but the bulk from the extra fiber will help the stool move  through your system better, which in turn decreases pressure and helps prevent the development of more pouches  and protect against future infection. Other medications: Pain relievers and medicines to control intestinal cramping may also be prescribed. People with serious infection, as well as the elderly and those who are imunocompromised or taking corticosteriods, may have to be hospitalized to receive intravenous antibiotics and fluids. Surgery to remove part of the colon may be necessary for people who have frequent infections or who develop severe complications like abscess or perforation of the colon. The best way to prevent diverticulitis is to keep diverticulosis under control. That means eating a high fiber diet - which requires 20 to 35 grams of fiber each day. Fiber is found in grains, vegetables, and fruits.  Also, it is important to drink enough fluids - at least eight glasses - throughout the day to help keep stool soft. You DO NOT need to avoid nuts, seeds or popcorn as they do NOT increase the risk if diverticulitis. As a matter of fact they are often a good source of fiber and can be helpful.      Thyroid nodule Check US 3-4-25.       Vaccine counseling Rec. Tdap and RSV. Recommend RSV vaccine.  Would check on insurance coverage at local pharmacy.  RSV is a one-time vaccine as of now.  Potential side effects with RSV vaccine are low-grade fevers body aches etc.  Also would recommend flu shot.  Separate from RSV vaccine by 2 weeks.  Also would recommend new COVID-vaccine.  Separate from other 2 vaccines by 2 weeks. Respiratory Syncytial Virus (RSV)  What is respiratory syncytial virus?   Respiratory syncytial virus, or RSV, is a common virus that affects the lungs and breathing passages.  What are the symptoms?   Symptoms of RSV typically include mild cold-like symptoms including runny nose, sore throat, cough, and headache. Sometimes RSV can lead to serious complications including:   Pneumonia (infection of the lungs)  More severe symptoms for people with asthma  More severe symptoms for people with chronic  obstructive pulmonary disease (COPD) (a chronic disease of the lungs that makes it hard to breathe)  Congestive heart failure (when the heart can't pump blood and oxygen to the body's tissues)  Older adults who get very sick from RSV may need to be hospitalized. Some may even die. Older adults are at greater risk than young adults for serious complications from RSV because the immune system weakens as one gets older.  Who is most at risk for complications?  RSV infections can be dangerous for infants and certain adults. Those at highest risk for severe RSV infection include:  Infants under 1 year of age  Older adults, especially those 65 years and older  Adults with chronic heart or lung disease  Adults with weakened immune systems  How is it treated?  There is no specific treatment for RSV infection, though researchers are working to develop vaccines and antivirals (medicines that fight viruses).  How can I protect others if I have RSV?  RSV season occurs each year in most regions of the [place] during fall, winter, and spring. If you are at high risk for severe RSV infection, or if you interact with an infant or older adult, you should take extra care to stay healthy:   Wash your hands often - Wash your hands often with soap and water for 20 seconds. If soap and water are not available, use an alcohol-based hand . Washing your hands will help protect you from germs.  Keep your hands off your face - Avoid touching your eyes, nose, and mouth with unwashed hands. Germs spread this way.   Avoid close contact with sick people - Avoid close contact, such as kissing, and sharing cups or eating utensils with people who have cold-like symptoms.  Cover your coughs and sneezes - Cover your mouth and nose with a tissue when coughing or sneezing. Throw the tissue in the trash afterward.  Clean and disinfect surfaces - Clean and disinfect surfaces that people frequently touch, such as doorknobs. When people infected with  RSV touch surfaces and objects, they can leave behind germs. Also, when they cough or sneeze, droplets containing germs can land on surfaces and objects.   Stay home when you are sick - If possible, stay home from work, school, and public areas when you are sick. This will help protect others from catching your illness.    Centers for Disease Control & Prevention (CDC)  RSV Symptoms & Care, https://www.cdc.gov/rsv/factsheet-older-adults.pdf       Colon cancer screening Up to date.       Hyperglycemia Check blood.       Breast cancer screening by mammogram Check mammogram. Continue self breast exam every month.        Adult general medical examination Check urine.       S/P laparoscopic-assisted sigmoidectomy      Scar of thoracic region      Non-celiac gluten sensitivity Avoidance.       Ocular migraine Stable.       Ocular hypertension of right eye Stable. FU opthomology.       Combined forms of age-related cataract of right eye Stable.       Dry eye syndrome of both eyes Stable.      Chronic idiopathic constipation Stable.       Posterior vitreous detachment of right eye Stable.     Yes                                                                                                                     Orders Placed This Encounter   Procedures    Lipid Panel    Comp Metabolic Panel (14)    CBC With Differential With Platelet    Vitamin D    Vitamin B12    TSH W Reflex To Free T4    Hemoglobin A1C    URINALYSIS, AUTO, W/O SCOPE    Advance Care Planning- 1st 30 minutes       Meds This Visit:  Requested Prescriptions     Signed Prescriptions Disp Refills    metoprolol succinate ER 50 MG Oral Tablet 24 Hr 270 tablet 0     Si po qam and 2 po at bedtime.       Imaging & Referrals:  US THYROID (CPT=76536)  [facility] VINNY 2D+3D SCREENING BILAT (CPT=77067/33456)      RTC 3 months for FU HTN.

## 2025-01-24 NOTE — PROGRESS NOTES
HPI:    Patient ID: Izabela Evans is a 64 year old female.    Izabela Evans is a 64 year old female who presents for a complete physical exam.   Izabela Evans is a 64 year old female who presents for a Medicare Assessment.     Chief Complaint   Patient presents with    Wellness Visit     Patient states she is here for an Medicare Wellness Visit.         Labs:   Lab Results   Component Value Date/Time    WBC 7.0 11/07/2023 08:56 AM    HGB 12.4 11/07/2023 08:56 AM    .0 11/07/2023 08:56 AM      Lab Results   Component Value Date/Time     (H) 11/07/2023 08:56 AM     11/07/2023 08:56 AM    K 4.1 11/07/2023 08:56 AM     11/07/2023 08:56 AM    CO2 25.0 11/07/2023 08:56 AM    CREATSERUM 1.01 11/07/2023 08:56 AM    CA 9.7 11/07/2023 08:56 AM    ALB 4.7 11/07/2023 08:56 AM    TP 7.7 11/07/2023 08:56 AM    ALKPHO 76 11/07/2023 08:56 AM    AST 19 11/07/2023 08:56 AM    ALT 17 11/07/2023 08:56 AM    BILT 0.6 11/07/2023 08:56 AM    TSH 1.671 11/07/2023 08:56 AM        Lab Results   Component Value Date/Time    CHOLEST 292 (H) 11/07/2023 08:56 AM    HDL 42 11/07/2023 08:56 AM    TRIG 310 (H) 11/07/2023 08:56 AM     (H) 11/07/2023 08:56 AM    NONHDLC 250 (H) 11/07/2023 08:56 AM       Lab Results   Component Value Date/Time    A1C 6.4 (H) 03/24/2023 08:27 AM      Lab Results   Component Value Date    VITD 43.9 01/13/2021         Health Maintenance   Topic Date Due    Mammogram  07/31/2024       Allergies:Allergies[1]  Current Outpatient Medications   Medication Sig Dispense Refill    Phentermine HCl 37.5 MG Oral Tab Take 1 tablet (37.5 mg total) by mouth.      metoprolol succinate ER 50 MG Oral Tablet 24 Hr 1 po qam and 2 po at bedtime. 270 tablet 0    Coenzyme Q10 (COQ10) 200 MG Oral Cap       B Complex-C-Folic Acid Oral Tab Take by mouth.      Multiple Vitamins-Iron (ONCE DAILY/IRON) Oral Tab Take by mouth 3 (three) times a week. Tuesday, Thursday and Saturday      Cholecalciferol  (VITAMIN D) 50 MCG (2000) Oral Cap Take by mouth.        Past Medical History:    Acute bilateral low back pain without sciatica    Colon polyp    [place]     High blood pressure    Hyperlipemia    Torn meniscus      Past Surgical History:   Procedure Laterality Date    Knee scope,med+lat menis repair Left 02/15/2023    [place] localization wire 1 site right (cpt=19281)      Remove uterus after         Family History   Problem Relation Age of Onset    Breast Cancer Mother 86    High Blood Pressure Mother     Diabetes Mother     High Cholesterol Mother     Heart Attack Mother     Cancer Father         Prostate    Cancer Paternal Grandmother         leukemia    Other (Other) Paternal Grandfather         Diverticulitis      Social History:  Social History     Socioeconomic History    Marital status:    Tobacco Use    Smoking status: Former     Current packs/day: 0.00     Types: Cigarettes     Quit date: 1999     Years since quittin.0     Passive exposure: Never    Smokeless tobacco: Never    Tobacco comments:     was more of a social smoker   Vaping Use    Vaping status: Never Used   Substance and Sexual Activity    Alcohol use: Yes     Alcohol/week: 2.0 standard drinks of alcohol     Types: 2 Glasses of wine per week     Comment: on wkends    Drug use: Never       History/Other:     Patient Active Problem List   Diagnosis    Hypertension Higher. Increase metoprolol.  Nurse only visit in 1 week to bring and check machine.  Call in 1 week later.Careful with diet and excercise at least 30 minutes 3-4 times a week. Check blood pressures at different times on different days. Can purchase own blood pressure monitor. If not, check at local pharmacy. Bake foods more and grill occasionally. Avoid fried foods. No salt. Use other seasonings.        Other headache syndrome Stable.       Other insomnia Improved.       Chronic lower back pain Stable.       Serum calcium elevated Check blood.        Goiter Check US 3-4-25. Check blood.       Mixed hyperlipidemia Check blood.       Hx of diverticulitis of colon      Thyroid nodule Check US 3-4-25.       Vaccine counseling Rec. Tdap and RSV. Recommend RSV vaccine.  Would check on insurance coverage at local pharmacy.  RSV is a one-time vaccine as of now.  Potential side effects with RSV vaccine are low-grade fevers body aches etc.  Also would recommend flu shot.  Separate from RSV vaccine by 2 weeks.  Also would recommend new COVID-vaccine.  Separate from other 2 vaccines by 2 weeks. Respiratory Syncytial Virus (RSV)  What is respiratory syncytial virus?   Respiratory syncytial virus, or RSV, is a common virus that affects the lungs and breathing passages.  What are the symptoms?   Symptoms of RSV typically include mild cold-like symptoms including runny nose, sore throat, cough, and headache. Sometimes RSV can lead to serious complications including:   Pneumonia (infection of the lungs)  More severe symptoms for people with asthma  More severe symptoms for people with chronic obstructive pulmonary disease (COPD) (a chronic disease of the lungs that makes it hard to breathe)  Congestive heart failure (when the heart can't pump blood and oxygen to the body's tissues)  Older adults who get very sick from RSV may need to be hospitalized. Some may even die. Older adults are at greater risk than young adults for serious complications from RSV because the immune system weakens as one gets older.  Who is most at risk for complications?  RSV infections can be dangerous for infants and certain adults. Those at highest risk for severe RSV infection include:  Infants under 1 year of age  Older adults, especially those 65 years and older  Adults with chronic heart or lung disease  Adults with weakened immune systems  How is it treated?  There is no specific treatment for RSV infection, though researchers are working to develop vaccines and antivirals (medicines that  fight viruses).  How can I protect others if I have RSV?  RSV season occurs each year in most regions of the [place] during fall, winter, and spring. If you are at high risk for severe RSV infection, or if you interact with an infant or older adult, you should take extra care to stay healthy:   Wash your hands often - Wash your hands often with soap and water for 20 seconds. If soap and water are not available, use an alcohol-based hand . Washing your hands will help protect you from germs.  Keep your hands off your face - Avoid touching your eyes, nose, and mouth with unwashed hands. Germs spread this way.   Avoid close contact with sick people - Avoid close contact, such as kissing, and sharing cups or eating utensils with people who have cold-like symptoms.  Cover your coughs and sneezes - Cover your mouth and nose with a tissue when coughing or sneezing. Throw the tissue in the trash afterward.  Clean and disinfect surfaces - Clean and disinfect surfaces that people frequently touch, such as doorknobs. When people infected with RSV touch surfaces and objects, they can leave behind germs. Also, when they cough or sneeze, droplets containing germs can land on surfaces and objects.   Stay home when you are sick - If possible, stay home from work, school, and public areas when you are sick. This will help protect others from catching your illness.    Centers for Disease Control & Prevention (CDC)  RSV Symptoms & Care, https://www.cdc.gov/rsv/factsheet-older-adults.pdf       Colon cancer screening Up to date.       Hyperglycemia Check blood.       Breast cancer screening by mammogram Check mammogram. Continue self breast exam every month.        Adult general medical examination Check urine.       S/P laparoscopic-assisted sigmoidectomy      Scar of thoracic region      Non-celiac gluten sensitivity      Ocular migraine Stable.       Ocular hypertension of right eye Stable. FU opthomology.       Combined forms  of age-related cataract of right eye Stable.       Dry eye syndrome of both eyes Stable.      Chronic idiopathic constipation Stable.       Posterior vitreous detachment of right eye Stable.        GYNE HISTORY:  OB History    Para Term  AB Living   2 2 0 0 0 0   SAB IAB Ectopic Multiple Live Births   0 0 0 0 0        No LMP recorded. Patient has had a hysterectomy.    Hx of Pap: all Paps normal      Sees gyne. in [place] and placed on phentermine. No cravings. Ost 10 #.     Hypertension  Patient is here for follow up of hypertension. BP at home: 139/90's. Arm cuff.   Has been compliant with medications.  Exercise level: not active L knee torn mensicus . and has been following low salt diet.  Weight has been stable.   and pt. Cooks. No added salt.   Wt Readings from Last 3 Encounters:   25 217 lb 3.2 oz (98.5 kg)   24 206 lb 6.4 oz (93.6 kg)   23 206 lb 8 oz (93.7 kg)     BP Readings from Last 3 Encounters:   25 (!) 162/78   23 152/88   23 (!) 183/86     Labs:   Lab Results   Component Value Date/Time     (H) 2023 08:56 AM     2023 08:56 AM    K 4.1 2023 08:56 AM     2023 08:56 AM    CO2 25.0 2023 08:56 AM    CREATSERUM 1.01 2023 08:56 AM    CA 9.7 2023 08:56 AM    AST 19 2023 08:56 AM    ALT 17 2023 08:56 AM    TSH 1.671 2023 08:56 AM        Lab Results   Component Value Date/Time    CHOLEST 292 (H) 2023 08:56 AM    HDL 42 2023 08:56 AM    TRIG 310 (H) 2023 08:56 AM     (H) 2023 08:56 AM    NONHDLC 250 (H) 2023 08:56 AM            Tobacco:  She smoked tobacco in the past but quit greater than 12 months ago.  Social History     Tobacco Use   Smoking Status Former    Current packs/day: 0.00    Types: Cigarettes    Quit date: 1999    Years since quittin.0    Passive exposure: Never   Smokeless Tobacco Never   Tobacco Comments    was  more of a social smoker        CAGE Alcohol Screen:   CAGE screening score of 0 on 1/23/2025, showing low risk of alcohol abuse.        Patient Care Team:  Maegan Valentino MD as PCP - General (Internal Medicine)  Shahnaz Kovacs PA as Physician Assistant (SURGERY, ORTHOPEDIC)  LAURITA Montoya MD as Consulting Physician (GASTROENTEROLOGY)  Abe Jon MD as Consulting Physician (OPHTHALMOLOGY)  Will Cantrell MD as Consulting Physician (RHEUMATOLOGY)  HPI    Review of Systems   Constitutional:  Negative for activity change, appetite change, chills, diaphoresis, fatigue, fever and unexpected weight change.   HENT:  Negative for congestion, dental problem, drooling, ear discharge, ear pain, facial swelling, hearing loss, mouth sores, nosebleeds, postnasal drip, rhinorrhea, sinus pressure, sinus pain, sneezing, sore throat, tinnitus, trouble swallowing and voice change.    Eyes:  Negative for photophobia, pain, discharge, redness, itching and visual disturbance.   Respiratory:  Negative for apnea, cough, choking, chest tightness, shortness of breath, wheezing and stridor.    Cardiovascular:  Negative for chest pain, palpitations and leg swelling.   Gastrointestinal:  Positive for constipation (Chronic.). Negative for abdominal distention, abdominal pain, anal bleeding, blood in stool, diarrhea, nausea, rectal pain and vomiting.   Endocrine: Negative for cold intolerance, heat intolerance, polydipsia, polyphagia and polyuria.   Genitourinary:  Negative for decreased urine volume, difficulty urinating, dysuria, flank pain, frequency, hematuria, menstrual problem, pelvic pain, urgency, vaginal bleeding, vaginal discharge and vaginal pain.   Skin:  Negative for rash.   Neurological:  Negative for dizziness, tremors, seizures, syncope, facial asymmetry, speech difficulty, weakness, light-headedness, numbness and headaches.   Psychiatric/Behavioral:  Negative for agitation, behavioral problems, confusion,  decreased concentration, dysphoric mood, hallucinations, self-injury, sleep disturbance and suicidal ideas. The patient is not nervous/anxious and is not hyperactive.    All other systems reviewed and are negative.          Functional Ability/Status:   Izabela Evans has a completely normal functional assessment. See flowsheet for details.        Fall Risk Assessment:   She has been screened for Falls and is low risk.        Medicare Hearing Assessment:   Hearing Screening    Time taken: 1/24/2025  2:07 PM  Entry User: Melissa Beard  Screening Method: Finger Rub  Finger Rub Result: Pass         Depression Screening (PHQ-2/PHQ-9): PHQ-2 SCORE: 0  , done 1/23/2025          Cognitive Assessment:   She had a completely normal cognitive assessment - see flowsheet entries       Supplementary Documentation:     In the past six months, have you lost more than 10 pounds without trying?: (Patient-Rptd) 2 - No  Has your appetite been poor?: (Patient-Rptd) No  Type of Diet: (Patient-Rptd) Balanced  How does the patient maintain a good energy level?: (Patient-Rptd) Other  How would you describe your daily physical activity?: (Patient-Rptd) Moderate  How would you describe your current health state?: (Patient-Rptd) Good  How do you maintain positive mental well-being?: (Patient-Rptd) Social Interaction;Puzzles;Visiting Friends;Visiting Family  On a scale of 0 to 10, with 0 being no pain and 10 being severe pain, what is your pain level?: (Patient-Rptd) 1 - (Mild)  In the past six months, have you experienced urine leakage?: (Patient-Rptd) 0-No  At any time do you feel concerned for the safety/well-being of yourself and/or your children, in your home or elsewhere?: (Patient-Rptd) No  Have you had any immunizations at another office such as Influenza, Hepatitis B, Tetanus, or Pneumococcal?: (Patient-Rptd) Yes    Visual Acuity:   Right Eye Visual Acuity: Uncorrected Right Eye Chart Acuity: 20/20   Left Eye Visual Acuity:  Uncorrected Left Eye Chart Acuity: 20/20   Both Eyes Visual Acuity: Uncorrected Both Eyes Chart Acuity: 20/20   Able To Tolerate Visual Acuity: Yes        PHYSICAL EXAM:   BP (!) 162/78 (BP Location: Left arm, Patient Position: Sitting, Cuff Size: adult)   Pulse 86   Temp 98 °F (36.7 °C) (Oral)   Ht 5' 7\" (1.702 m)   Wt 217 lb 3.2 oz (98.5 kg)   SpO2 100%   BMI 34.02 kg/m²   BP Readings from Last 3 Encounters:   01/24/25 (!) 162/78   12/06/23 152/88   11/16/23 (!) 183/86     Wt Readings from Last 3 Encounters:   01/24/25 217 lb 3.2 oz (98.5 kg)   01/24/24 206 lb 6.4 oz (93.6 kg)   12/06/23 206 lb 8 oz (93.7 kg)      Body mass index is 34.02 kg/m².   Physical Exam  Vitals and nursing note reviewed.   Constitutional:       General: She is not in acute distress.     Appearance: Normal appearance. She is well-developed and well-groomed. She is not ill-appearing, toxic-appearing or diaphoretic.      Interventions: She is not intubated.  HENT:      Head: Normocephalic and atraumatic.      Right Ear: Tympanic membrane, ear canal and external ear normal. No decreased hearing noted. No laceration, drainage, swelling or tenderness. No middle ear effusion. There is no impacted cerumen. No foreign body. No mastoid tenderness. No PE tube. No hemotympanum. Tympanic membrane is not injected, scarred, perforated, erythematous, retracted or bulging. Tympanic membrane has normal mobility.      Left Ear: Tympanic membrane, ear canal and external ear normal. No decreased hearing noted. No laceration, drainage, swelling or tenderness.  No middle ear effusion. There is no impacted cerumen. No foreign body. No mastoid tenderness. No PE tube. No hemotympanum. Tympanic membrane is not injected, scarred, perforated, erythematous, retracted or bulging. Tympanic membrane has normal mobility.      Nose:      Right Sinus: No maxillary sinus tenderness or frontal sinus tenderness.      Left Sinus: No maxillary sinus tenderness or frontal  sinus tenderness.      Mouth/Throat:      Lips: Pink. No lesions.      Mouth: Mucous membranes are moist. No injury, lacerations, oral lesions or angioedema.      Dentition: No dental tenderness, gingival swelling, dental abscesses or gum lesions.      Tongue: No lesions. Tongue does not deviate from midline.      Palate: No mass and lesions.      Pharynx: Oropharynx is clear. Uvula midline. No pharyngeal swelling, oropharyngeal exudate, posterior oropharyngeal erythema or uvula swelling.      Tonsils: No tonsillar exudate or tonsillar abscesses.   Eyes:      General: Lids are normal. Gaze aligned appropriately. No scleral icterus.        Right eye: No foreign body, discharge or hordeolum.         Left eye: No foreign body, discharge or hordeolum.      Extraocular Movements: Extraocular movements intact.      Right eye: Normal extraocular motion and no nystagmus.      Left eye: Normal extraocular motion and no nystagmus.      Conjunctiva/sclera: Conjunctivae normal.      Right eye: Right conjunctiva is not injected. No chemosis, exudate or hemorrhage.     Left eye: Left conjunctiva is not injected. No chemosis, exudate or hemorrhage.     Pupils: Pupils are equal, round, and reactive to light.   Neck:      Thyroid: No thyroid mass, thyromegaly or thyroid tenderness.      Vascular: Normal carotid pulses. No carotid bruit, hepatojugular reflux or JVD.      Trachea: Trachea and phonation normal. No tracheal tenderness, tracheostomy, abnormal tracheal secretions or tracheal deviation.   Cardiovascular:      Rate and Rhythm: Normal rate and regular rhythm.      Pulses: Normal pulses.           Carotid pulses are 2+ on the right side and 2+ on the left side.       Radial pulses are 2+ on the right side and 2+ on the left side.        Dorsalis pedis pulses are 2+ on the right side and 2+ on the left side.        Posterior tibial pulses are 2+ on the right side and 2+ on the left side.      Heart sounds: Normal heart sounds,  S1 normal and S2 normal.   Pulmonary:      Effort: Pulmonary effort is normal. No tachypnea, bradypnea, accessory muscle usage, prolonged expiration, respiratory distress or retractions. She is not intubated.      Breath sounds: Normal breath sounds and air entry. No stridor, decreased air movement or transmitted upper airway sounds. No decreased breath sounds, wheezing, rhonchi or rales.   Chest:      Chest wall: No tenderness.   Breasts:     Breasts are symmetrical.      Right: No swelling, bleeding, inverted nipple, mass, nipple discharge, skin change or tenderness.      Left: No swelling, bleeding, inverted nipple, mass, nipple discharge, skin change or tenderness.   Abdominal:      General: Bowel sounds are normal. There is no distension.      Palpations: Abdomen is soft. Abdomen is not rigid. There is no fluid wave, hepatomegaly, splenomegaly or mass.      Tenderness: There is no abdominal tenderness. There is no right CVA tenderness, left CVA tenderness, guarding or rebound.   Genitourinary:     Exam position: Supine.   Musculoskeletal:      Cervical back: Neck supple. No tenderness.      Right lower leg: No edema.      Left lower leg: No edema.   Lymphadenopathy:      Head:      Right side of head: No submental, submandibular, preauricular, posterior auricular or occipital adenopathy.      Left side of head: No submental, submandibular, preauricular, posterior auricular or occipital adenopathy.      Cervical: No cervical adenopathy.      Right cervical: No superficial, deep or posterior cervical adenopathy.     Left cervical: No superficial, deep or posterior cervical adenopathy.      Upper Body:      Right upper body: No supraclavicular, axillary or pectoral adenopathy.      Left upper body: No supraclavicular, axillary or pectoral adenopathy.   Skin:     General: Skin is warm and dry.      Coloration: Skin is not pale.      Findings: No erythema or rash.   Neurological:      Mental Status: She is alert and  oriented to person, place, and time.   Psychiatric:         Mood and Affect: Mood normal.         Speech: Speech normal.         Behavior: Behavior normal. Behavior is cooperative.              ASSESSMENT/PLAN:     Encounter Diagnoses   Name Primary?      Patient Active Problem List   Diagnosis    Hypertension Higher. discussed with patient of options.  Increase metoprolol.  Since just filled metoprolol for 90-day supply.  Nurse only visit in 1 week to bring and check machine.  Call in 1 week later. Careful with diet and excercise at least 30 minutes 3-4 times a week. Check blood pressures at different times on different days. Can purchase own blood pressure monitor. If not, check at local pharmacy. Bake foods more and grill occasionally. Avoid fried foods. No salt. Use other seasonings.        Other headache syndrome Stable.       Other insomnia Improved.       Chronic lower back pain Stable.       Serum calcium elevated Check blood.       Goiter Check US 3-4-25. Check blood.       Mixed hyperlipidemia Check blood.       Hx of diverticulitis of colon   Diverticulosis is the condition of having small pouches protruding from the wall of the colon. These pouches are extremely common among Americans, for example, where about 10 percent of people over age 40, half of those older than 60, and two-thirds over age 80 have  them. Diverticulosis itself is really not a problem, as the pouches themselves are harmless and rarely cause symptoms. However, the situation becomes more serious if the pouches become infected from, for example, stool getting trapped in the pouch. If infection occurs, the condition is called diverticulitis. Diverticulitis is more serious because infection can lead to other problems. Diverticulosis leads to diverticulitis in about 15-20 percent of cases.  Researchers think a diet low in fiber is to blame for a high incidence of diverticulosis. Fiber is important because it helps keep stool soft and bulky  so it can pass easily through the colon. Without enough fiber stool becomes hard, which creates pressure in the colon as the muscles strain to move the stool. This pressure is what causes the parts of the wall of the colon to pop out  into pouches.    Most people with diverticulosis don’t know they have it unless they have studies of the colon done for other reasons and the doctor notices the pouches. But some people do have signs like mild cramps, bloating, or constipation. People whose diverticulosis develops into diverticulitis (this is a very rare occurrence) have symptoms of abdominal pain (usually in the lower left side), fever, nausea, vomiting, diarrhea or constipation, and chills. Symptoms of diverticulosis and diverticulitis are similar to other conditions, such as appendicitis, ovarian cyst, peptic ulcer, Crohn’s disease, and irritable bowel syndrome - so the doctor may do tests such as x-rays, ultrasound, or endoscopy to make the right diagnosis. Prompt treatment for diverticulitis is important because infection can cause complications like abscess (an infected area that can destroy tissue); perforation of the colon, which can let the infection leak out into the abdominal cavity; and peritonitis, an infection in the abdominal cavity that can be very serious and requires immediate medical attention. Treatment usually involves two steps: antibiotics to clear up the infection, and diet restrictions while the colon heals. Antibiotics: The doctor usually prescribes a 7 to 10- day course of antibiotics, and the doctor’s office will probably check with you daily to make sure the infection is not getting worse (if it is, you may need to check into the hospital for more aggressive antibiotic treatment).    Diet therapy: Until the colon heals, you’ll need to be on a low fiber diet. After the infection is gone, the doctor will want you to switch to a high fiber diet. The new diet will not get rid of existing  pouches in the colon, but the bulk from the extra fiber will help the stool move  through your system better, which in turn decreases pressure and helps prevent the development of more pouches and protect against future infection. Other medications: Pain relievers and medicines to control intestinal cramping may also be prescribed. People with serious infection, as well as the elderly and those who are imunocompromised or taking corticosteriods, may have to be hospitalized to receive intravenous antibiotics and fluids. Surgery to remove part of the colon may be necessary for people who have frequent infections or who develop severe complications like abscess or perforation of the colon. The best way to prevent diverticulitis is to keep diverticulosis under control. That means eating a high fiber diet - which requires 20 to 35 grams of fiber each day. Fiber is found in grains, vegetables, and fruits.  Also, it is important to drink enough fluids - at least eight glasses - throughout the day to help keep stool soft. You DO NOT need to avoid nuts, seeds or popcorn as they do NOT increase the risk if diverticulitis. As a matter of fact they are often a good source of fiber and can be helpful.      Thyroid nodule Check US 3-4-25.       Vaccine counseling Rec. Tdap and RSV. Recommend RSV vaccine.  Would check on insurance coverage at local pharmacy.  RSV is a one-time vaccine as of now.  Potential side effects with RSV vaccine are low-grade fevers body aches etc.  Also would recommend flu shot.  Separate from RSV vaccine by 2 weeks.  Also would recommend new COVID-vaccine.  Separate from other 2 vaccines by 2 weeks. Respiratory Syncytial Virus (RSV)  What is respiratory syncytial virus?   Respiratory syncytial virus, or RSV, is a common virus that affects the lungs and breathing passages.  What are the symptoms?   Symptoms of RSV typically include mild cold-like symptoms including runny nose, sore throat, cough, and  headache. Sometimes RSV can lead to serious complications including:   Pneumonia (infection of the lungs)  More severe symptoms for people with asthma  More severe symptoms for people with chronic obstructive pulmonary disease (COPD) (a chronic disease of the lungs that makes it hard to breathe)  Congestive heart failure (when the heart can't pump blood and oxygen to the body's tissues)  Older adults who get very sick from RSV may need to be hospitalized. Some may even die. Older adults are at greater risk than young adults for serious complications from RSV because the immune system weakens as one gets older.  Who is most at risk for complications?  RSV infections can be dangerous for infants and certain adults. Those at highest risk for severe RSV infection include:  Infants under 1 year of age  Older adults, especially those 65 years and older  Adults with chronic heart or lung disease  Adults with weakened immune systems  How is it treated?  There is no specific treatment for RSV infection, though researchers are working to develop vaccines and antivirals (medicines that fight viruses).  How can I protect others if I have RSV?  RSV season occurs each year in most regions of the [place] during fall, winter, and spring. If you are at high risk for severe RSV infection, or if you interact with an infant or older adult, you should take extra care to stay healthy:   Wash your hands often - Wash your hands often with soap and water for 20 seconds. If soap and water are not available, use an alcohol-based hand . Washing your hands will help protect you from germs.  Keep your hands off your face - Avoid touching your eyes, nose, and mouth with unwashed hands. Germs spread this way.   Avoid close contact with sick people - Avoid close contact, such as kissing, and sharing cups or eating utensils with people who have cold-like symptoms.  Cover your coughs and sneezes - Cover your mouth and nose with a tissue when  coughing or sneezing. Throw the tissue in the trash afterward.  Clean and disinfect surfaces - Clean and disinfect surfaces that people frequently touch, such as doorknobs. When people infected with RSV touch surfaces and objects, they can leave behind germs. Also, when they cough or sneeze, droplets containing germs can land on surfaces and objects.   Stay home when you are sick - If possible, stay home from work, school, and public areas when you are sick. This will help protect others from catching your illness.    Centers for Disease Control & Prevention (CDC)  RSV Symptoms & Care, https://www.cdc.gov/rsv/factsheet-older-adults.pdf       Colon cancer screening Up to date.       Hyperglycemia Check blood.       Breast cancer screening by mammogram Check mammogram. Continue self breast exam every month.        Adult general medical examination Check urine.       S/P laparoscopic-assisted sigmoidectomy      Scar of thoracic region      Non-celiac gluten sensitivity Avoidance.       Ocular migraine Stable.       Ocular hypertension of right eye Stable. FU opthomology.       Combined forms of age-related cataract of right eye Stable.       Dry eye syndrome of both eyes Stable.      Chronic idiopathic constipation Stable.       Posterior vitreous detachment of right eye Stable.     Yes                                                                                                                     Orders Placed This Encounter   Procedures    Lipid Panel    Comp Metabolic Panel (14)    CBC With Differential With Platelet    Vitamin D    Vitamin B12    TSH W Reflex To Free T4    Hemoglobin A1C    URINALYSIS, AUTO, W/O SCOPE    Advance Care Planning- 1st 30 minutes       Meds This Visit:  Requested Prescriptions     Signed Prescriptions Disp Refills    metoprolol succinate ER 50 MG Oral Tablet 24 Hr 270 tablet 0     Si po qam and 2 po at bedtime.       Imaging & Referrals:  US THYROID (CPT=76536)  [facility] VINNY 2D+3D  SCREENING BILAT (CPT=77067/65679)      RTC 3 months for FU HTN.     1. Adult general medical examination (Primary)  -     Lipid Panel; Future; Expected date: 01/26/2025  -     Comp Metabolic Panel (14); Future; Expected date: 01/26/2025  -     CBC With Differential With Platelet; Future; Expected date: 01/25/2025  -     Vitamin D; Future; Expected date: 01/24/2025  -     Vitamin B12; Future; Expected date: 01/26/2025  -     TSH W Reflex To Free T4; Future; Expected date: 01/26/2025  -     Hemoglobin A1C; Future; Expected date: 01/26/2025  -     URINALYSIS, AUTO, W/O SCOPE  2. Breast cancer screening by mammogram  -     [facility] VINNY 2D+3D SCREENING BILAT (CPT=77067/36877); Future; Expected date: 01/24/2025  3. Chronic idiopathic constipation  4. Combined forms of age-related cataract of right eye  5. Colon cancer screening  6. Dry eye syndrome of both eyes  7. Goiter  -     TSH W Reflex To Free T4; Future; Expected date: 01/26/2025  8. Hx of diverticulitis of colon  Overview:  hx of colon surgery-sigmoidectomy in 2012 for diverticulitis, who presents for f u CT showing diverticulitis.     -patient was seen in hospital in March 2022 for acute left-sided diverticulitis.  Prior history of sigmoidectomy in 2012  -Last colonoscopy in 2019, had polyps at that time was planning on repeating this year  -finished levaquin/flagyl  -Patient currently denies any GI symptoms of nausea, vomiting, dyspepsia, dysphagia, hematemesis, abdominal pain, change in bowel habits, thin stools, hematochezia, or melena.  Additionally there is no weight loss and no reported history of chest pain or shortness of breath.  -She really appreciates being on Metamucil daily, feels she is no longer straining or pushing.  This has helped significantly.  She is also taking high-fiber diet.  9. Hyperglycemia  -     Hemoglobin A1C; Future; Expected date: 01/26/2025  -     URINALYSIS, AUTO, W/O SCOPE  10. Primary hypertension  11. Mixed  hyperlipidemia  Overview:  Dx in 2016 at [facility]  Orders:  -     Lipid Panel; Future; Expected date: 01/26/2025  -     Comp Metabolic Panel (14); Future; Expected date: 01/26/2025  12. Non-celiac gluten sensitivity  -     CBC With Differential With Platelet; Future; Expected date: 01/25/2025  -     Vitamin B12; Future; Expected date: 01/26/2025  13. Ocular migraine  Overview:  Kaleidoscope visual changes lasting approximately 10 to 15 minutes then resolving without headaches.  No ptosis no diplopia no anisocoria.  Within the last 2 years.  14. Ocular hypertension of right eye  15. Vaccine counseling  16. Thyroid nodule  Overview:  4/8/19 thyroid u/s  Orders:  -     TSH W Reflex To Free T4; Future; Expected date: 01/26/2025  -      THYROID (CPT=76536); Future; Expected date: 03/04/2025  17. Serum calcium elevated  -     Comp Metabolic Panel (14); Future; Expected date: 01/26/2025  -     Vitamin D; Future; Expected date: 01/24/2025  18. Scar of thoracic region  Overview:  CT on 5-22: bilateral pleuroparenchymal scarring  19. S/P laparoscopic-assisted sigmoidectomy  20. Posterior vitreous detachment of right eye  21. Other insomnia  22. Other headache syndrome  23. Encounter for annual health examination  -     Advance Care Planning- 1st 30 minutes  Other orders  -     Metoprolol Succinate ER; 1 po qam and 2 po at bedtime.  Dispense: 270 tablet; Refill: 0      The patient indicates understanding of these issues and agrees to the plan.  Further testing ordered.  Imaging studies ordered.  Lab work ordered.  Reinforced healthy diet, lifestyle, and exercise.      No follow-ups on file.    Advanced Directives:   She does have a Living Will but we do NOT have it on file in Epic.    She does have a POA but we do NOT have it on file in Epic.    Discussed Advance Care Planning with patient (and family/surrogate if present). Standard forms made available to patient in After Visit Summary.  16+ minutes was spent with all Advanced  Care Planning elements today (up to 30 minutes for CPT 36784)    MD Izabela Mart's SCREENING SCHEDULE   Tests on this list are recommended by your physician but may not be covered, or covered at this frequency, by your insurer.   Please check with your insurance carrier before scheduling to verify coverage.   PREVENTATIVE SERVICES FREQUENCY &  COVERAGE DETAILS LAST COMPLETION DATE   Diabetes Screening    Fasting Blood Sugar /  Glucose    One screening every 12 months if never tested or if previously tested but not diagnosed with pre-diabetes   One screening every 6 months if diagnosed with pre-diabetes Lab Results   Component Value Date     (H) 11/07/2023        Cardiovascular Disease Screening    Lipid Panel  Cholesterol  Lipoprotein (HDL)  Triglycerides Covered every 5 years for all Medicare beneficiaries without apparent signs or symptoms of cardiovascular disease Lab Results   Component Value Date    CHOLEST 292 (H) 11/07/2023    HDL 42 11/07/2023     (H) 11/07/2023    TRIG 310 (H) 11/07/2023         Electrocardiogram (EKG)   Covered if needed at Welcome to Medicare, and non-screening if indicated for medical reasons 11/04/2023      Ultrasound Screening for Abdominal Aortic Aneurysm (AAA) Covered once in a lifetime for one of the following risk factors    Men who are 65-75 years old and have ever smoked    Anyone with a family history -     Colorectal Cancer Screening  Covered for ages 50-85; only need ONE of the following:    Colonoscopy   Covered every 10 years    Covered every 2 years if patient is at high risk or previous colonoscopy was abnormal 06/06/2022    Health Maintenance   Topic Date Due    Colorectal Cancer Screening  06/06/2027       Flexible Sigmoidoscopy   Covered every 4 years -    Fecal Occult Blood Test Covered annually -   Bone Density Screening    Bone density screening    Covered every 2 years after age 65 if diagnosed with risk of osteoporosis or  estrogen deficiency.    Covered yearly for long-term glucocorticoid medication use (Steroids) Last Dexa Scan:    XR DEXA BONE DENSITOMETRY (CPT=77080) 09/05/2024      No recommendations at this time   Pap and Pelvic    Pap   Covered every 2 years for women at normal risk; Annually if at high risk -  No recommendations at this time    Chlamydia Annually if high risk -  No recommendations at this time   Screening Mammogram    Mammogram     Recommend annually for all female patients aged 40 and older    One baseline mammogram covered for patients aged 35-39 07/31/2023    Health Maintenance   Topic Date Due    Mammogram  07/31/2024       Immunizations    Influenza Covered once per flu season  Please get every year 11/01/2024  No recommendations at this time    Pneumococcal Each vaccine (Qbauqih19 & [id number]) covered once after 65 Prevnar 13: -    [id number]: 10/13/2017     No recommendations at this time    Hepatitis B One screening covered for patients with certain risk factors   -  No recommendations at this time    Tetanus Toxoid Not covered by Medicare Part B unless medically necessary (cut with metal); may be covered with your pharmacy prescription benefits -    Tetanus, Diptheria and Pertusis TD and TDaP Not covered by Medicare Part B -  No recommendations at this time    Zoster Not covered by Medicare Part B; may be covered with your pharmacy  prescription benefits 07/31/2012  No recommendations at this time                Understanding Advance Care Planning  Advance care planning is the process of deciding one’s own future medical care. It helps assure that if you can’t speak for yourself, your wishes can still be carried out. The plan is a series of legal documents that note a person’s wishes. The documents vary by state. Advance care planning should be discussed at a regular office visit with your primary care provider before an acute illness. Advance care planning is encouraged when a person has a serious  illness that is expected to get worse. It may also be done before major surgery. And it can help you and your family be prepared in case of a major illness or injury. Advance care planning helps with making decisions at these times.     Who will speak on your behalf?  A healthcare proxy is a person who acts as the voice of a patient when the patient can’t speak for himself or herself. The name of this role varies by state. It may be called a Durable Medical Power of  or Durable Power of  for Healthcare. It may be called an agent, surrogate, or advocate. Or it may be called a representative or decision maker. It's an official duty that is identified by a legal document. The document also varies by state.   Why is advance care planning important?   If a person communicates his or her healthcare wishes:   He or she will be given medical care that matches his or her values and goals.  Family members will not be forced to make decisions in a crisis with no guidance.  Creating a plan  Making an advance care plan is often done in 3 steps:   Thinking about one’s wishes. To create an advance care plan, think about what kind of medical treatment you would want if you lose the ability to communicate. Are there any situations in which you would refuse or stop treatment? Are there therapies you would want or not want? And whom do you want to make decisions for you? There are many places to learn more about how to plan for your care. Ask your healthcare provider or  for resources.  Picking a healthcare proxy. This means choosing a trusted person to speak for you only when you can’t speak for yourself. When you can't make medical decisions, your proxy makes sure the instructions in your advance care plan are followed. A proxy doesn't make decisions based on his or her own opinions. They must put aside those opinions and values if needed, and carry out your wishes.  Filling out the legal documents.  There are several kinds of legal documents for advance care planning. Each one tells healthcare providers your wishes. The documents may vary by state. They must be signed and may need to be witnessed or notarized. You can cancel or change them whenever you wish. Depending on your state, the documents may include a Healthcare Proxy form, Living Will, Durable Medical Power of , and Advance Directive.  The family’s role  The best help a family can give is to support their loved one’s wishes. Open and honest communication is vital. Family should express any concerns they have about the patient’s choices while the patient can still make decisions in the event that his or her illness prevents communicating those wishes at a later time.    [employer] last reviewed this educational content on 12/1/2019    © 0306-0026 [employer]. All rights reserved. This information is not intended as a substitute for professional medical care. Always follow your healthcare professional's instructions.          Advance Medical Directive  An advance medical directive is a form that lets you plan ahead for the care you’d want if you could no longer express your wishes. This statement outlines the medical treatment you’d want or names the person you’d wish to make healthcare decisions for you. Be aware that laws vary from state to state, and it may be worthwhile to talk with an .     Writing down your wishes  An advance directive is important whether you’re young or old. Injury or illness can strike at any age.  Decide what is important to you and the kind of treatment you’d want, or not want to have.  Some states allow only one kind of advance directive. Some let you do both a durable power of  for healthcare and a living will. Some states put both kinds on the same form.    A durable power of  (POA) for healthcare   This form lets you name someone else that you have chosen and trust to speak and  make decisions on your behalf.  This person can decide on treatment for you only when you can’t speak for yourself.  You don't need to be at the end of your life. They could speak for you if you were in a coma but were likely to recover.    A living will  This form lets you list the care you want at the end of your life.  A living will applies only if you won’t live without medical treatment. It would apply if you had advanced cancer, a massive stroke, or other serious illness from which you will not recover.  It takes effect only when you can no longer express your wishes yourself.    [email or web address] last reviewed this educational content on 2/1/2021 © 2000-2021 [employer]. All rights reserved. This information is not intended as a substitute for professional medical care. Always follow your healthcare professional's instructions.          Choosing an Agent  A durable power of  for healthcare is only as good as the person you name to be your agent. Your agent is the person you have chosen to speak and make decisions on your behalf. If this person knows your treatment wishes and is willing to carry them out, you’ll probably be well represented. Be sure to tell your agent what’s important to you.     Who to choose  Here are suggestions for choosing an agent:  You can name a family member, close friend, , , or rabbi.  You should name one person as your agent. Then name one or two alternates. You need a backup person in case your first choice can’t be reached when needed.  Talk with each person you're thinking of naming as your agent or alternate. Do this before you decide who should carry out your wishes.  Your agent should be ...  A competent adult, age 18 or older  Someone you trust and can talk to about the care you want and what's important to you  Someone who supports your treatment choices    In many states, your agent can’t be ...   Your healthcare provider  An employee of your  provider or of a hospital, nursing home, or hospice program where you receive care  Some states have other restrictions on who can be named as an agent for an advance directive.   Be prepared  Tip: It's a good idea to write down your wishes and give a copy to your agent and all others who are involved with your healthcare.   Shannan last reviewed this educational content on 8/1/2021    © 6336-6549 [employer]. All rights reserved. This information is not intended as a substitute for professional medical care. Always follow your healthcare professional's instructions.          Understanding DNR Orders  Do not resuscitate (DNR) orders tell hospital staff not to do potentially life-restoring measures, such as CPR, if you or your loved one's heart and lungs stop working. It allows a natural death, and prevents healthcare staff from artificially reviving a person and placing them on life support. In many states, a DNR order also applies to staff outside the hospital such as in nursing homes and emergency medical services. A DNR order must be written by a healthcare provider. Or in some cases, certain other healthcare workers write it. This can only be done with the person’s or family’s consent. If a person has not written an advance directive, their family will decide on a DNR with the help of the healthcare team.   The person can cancel a DNR order at any time. The healthcare team can answer questions about the DNR form. Have copies of a DNR form are readily available so that your wishes can be faithfully followed.   Writing a DNR order  When might a DNR order be written? When the person’s health condition is such that, in the case of cardiac arrest, CPR and other resuscitation methods are not desired. This could be because the chance of successful resuscitation is very low. Or it could be because the care plan now focuses on comfort measures instead of life-sustaining measures. Coma and terminal illness  are instances when a DNR order might be used.   Irreversible coma  In a coma, a person does not respond to sight, sound, or touch. The heart and lungs could be working, but brain function is damaged due to trauma or disease.   Terminal illness  In the last stages of heart disease, AIDS, cancer, and other illnesses, some people don’t want to prolong their suffering. If recovery isn’t likely and quality of life is poor or getting worse, a person or their family may agree to a DNR order.   DNR orders and hospice care  A hospice program can offer care during the final weeks of life. Hospice programs provide dignity, pain control and comfort care in the home or at special facilities. Hospice does not provide aggressive treatment. In fact, a DNR order will likely be discussed before a person is admitted to hospice. A  or  may be able to help you arrange for hospice support.   Documenting end-of-life wishes  In addition to DNR orders, a person with a serious, life-limiting illness may wish to document their treatment wishes. This is called a POST form or by different names, depending on the state. It's meant to provide a portable medical order to help guide healthcare providers on the specific medical treatments a person wants during a medical emergency. It's meant to complement a DNR order, not replace it. Your healthcare provider can tell you more and help you complete the forms. The form may be called one of these:   MOLST (medical orders for life-sustaining treatment)  POLST (physician orders for life-sustaining treatment)  MOST (medical orders for scope of treatment)  POST (physician orders for scope of treatment)  TPOPP (transportable physician orders for patient preferences)  Shannan last reviewed this educational content on 7/1/2021    © 4379-7853 [employer]. All rights reserved. This information is not intended as a substitute for professional medical care. Always follow  your healthcare professional's instructions.          An Agent’s Role for Durable Power of  for Health Care   It’s impossible to know which medical treatment choices you might face in the future. What if you aren't able to make these decisions for yourself? A durable power of  for healthcare lets you name an agent to speak and carry out your wishes on your behalf. This happens only if you can’t express your wishes yourself.     An agent’s duty  Your agent respects your wishes in the following ways:    Your agent’s duty is to see that your wishes are followed.  If your wishes aren’t known, your agent should try to decide what you want.  Your agent’s choices come before anyone else’s wishes for you.  A durable power of  for healthcare doesn't not give your agent control over your money . Your agent also can’t be made to pay your bills.  Find out what your agent can do  Restrictions on what an agent can and can’t do vary by state. Check your state laws. In most states your agent can:   Choose or refuse life-sustaining and other medical treatment on your behalf  Consent to treatment, and then stop treatment if your condition doesn’t improve  Access and release your medical records  Request an autopsy and donate your organs, unless you’ve stated otherwise in your advance directive  Find out whether your state allows your agent to do the following:   Refuse or withdraw life-enhancing care  Refuse or stop tube feeding or other life-sustaining care--even if you haven’t stated on your advance directive that you don’t want these treatments  Order sterilization or   Shannan last reviewed this educational content on 2021 [employer]. All rights reserved. This information is not intended as a substitute for professional medical care. Always follow your healthcare professional's instructions.          Being a Healthcare Proxy  A healthcare proxy is someone who  represents a person who can’t speak for themself. The name of this role varies by state. It may be called a Durable Medical Power of . It may be called a Durable Power of  for Healthcare. It may be called an agent, surrogate, or advocate. Or it may be called a representative or decision maker. It's an official duty that is noted by a legal document. The document also varies by state. The person must name you as his or her proxy on the document.      A healthcare proxy speaks for another person when he or she is not able to do so. The proxy helps make sure the person’s healthcare wishes are known and followed.     What it means to be a healthcare proxy   Your role as healthcare proxy starts when the person can’t make medical decisions. This assessment can only be made by a licensed doctor. You then make the healthcare decisions as needed. You do this by carrying out the person’s wishes. These wishes are noted in his or her advance care planning documents. These declare what kind of treatment the person wishes to have or not have. You may need to put aside your own values and opinions to carry out the person’s wishes. This may include refusing or stopping life-sustaining treatments.   Documenting end-of-life wishes   As a healthcare proxy, encourage the person to discuss his or her wishes, while they are able. They can do this with their healthcare provider and then document the wishes as a medical order. The provider can help the person complete the form. The forms are known by different names depending on the state. The form may be called one of these:     MOLST (medical orders for life-sustaining treatment)  POLST (physician orders for life-sustaining treatment)  MOST (medical orders for scope of treatment)  POST (physician orders for scope of treatment)  TPOPP (transportable physician orders for patient preferences)    The form documents the person’s wishes at the end of life. It's not tied to a  certain healthcare provider or facility. It's different than a living will. The form is an order written according to state regulations by a healthcare provider. To complete one, the person must express his or her wishes to an advanced healthcare provider. If the person can’t make his or her own decisions, then this is done by the person’s healthcare proxy.   Carrying out your role  Your duties depend on what the person’s advance care planning documents say. Your duties may also depend on state law. In general:   Before accepting a role as a proxy, talk with the person. Be sure you know his or her wishes. Ask questions. This will help you be his or her voice if and when it is needed.  Be sure that the person’s healthcare team knows that you are his or her proxy. Carry a copy of the document and proof of your identity.  Make sure the healthcare team has a copy of the advance care planning documents.  Talk to the healthcare team. Ask questions as often as you need. Stay informed about the person’s condition.  Ask for any help you need to understand the medical situation. Ask about the person’s condition and prognosis. Ask about risks and benefits of tests and treatments. Find out all the facts and options.  Speak on the person’s behalf with the healthcare team when needed.  Talk with family members and keep them informed.  Know your rights. You have the right to ask for information. You can ask for consultations and second opinions. You have the right to request or refuse treatment for the person. You may be able to review his or her medical chart. You can authorize the person’s transfer to another facility. You can also request a new healthcare provider for him or her. If you are not sure what your rights are at any time, ask a .  When it’s time to make decisions  If the person’s wishes are clear in the advance care plan documents, ask for them to be carried out as noted. If they are not clear, talk with  the healthcare team. Listen to the team’s recommendations. Talk with a  or counselor. It may be hard for you to make a decision at times. You may feel sad or upset about a decision. Being a healthcare proxy is not an easy role. But it's an important one. Remember that the person trusts you to carry out his or her wishes.   If you need help  Ask the healthcare team if you have trouble with a decision. The healthcare team will help you.  Encourage the person you are helping to have a conversation with their provider about their end-of-life wishes. The provider can help them fill out the form.  You may need help in resolving family conflicts. Ask the hospital or clinic , ethics consultant, or a  for help.  If you are having trouble talking with the healthcare team while the person is in the hospital, reach out to the patient relations department. Or ask to speak to the hospital ombudsman or ethics committee.    Shannan last reviewed this educational content on 9/1/2019 © 2000-2021 [employer]. All rights reserved. This information is not intended as a substitute for professional medical care. Always follow your healthcare professional's instructions.          Life Support  If you understand how specific treatments may affect your quality of life, you can decide which ones you’d choose or refuse. You may want to talk to your healthcare provider about the possible benefits and risks of treatments. The chance of good results from these therapies varies based on each individual clinical situation and can be very hard to predict. Medical treatment, if your life is in danger, falls into 3 main categories.     Life supporting  This care keeps your heart and lungs going when they can no longer work on their own.  CPR restarts your heart and lungs if they stop working.  A respirator (or ventilator) keeps you breathing. Air is pumped into your lungs through a tube  that’s put into your windpipe.    Life sustaining  This care keeps you alive longer when you have an illness that can’t be cured.  Tube feeding or TPN (total parenteral nutrition) provides food and fluids through a tube or IV (intravenous). It is given if you can’t chew or swallow on your own.  Dialysis is a kidney machine that cleans your blood when your kidneys can no longer work on their own.    Life enhancing  This care controls pain and discomfort, such as nausea or trouble breathing. This type of care is not designed to prolong your life, but to enhance comfort and quality of life. Nothing is done to keep you alive longer.  Hospice care is comfort care. It might provide food and fluids by mouth or help with bathing. Hospice care is given during the last stages of a terminal illness.  Strong pain medicine can be given to help keep you comfortable.    Do Not Resuscitate (DNR)  Would you want CPR if your heart stops while you’re a patient in a hospital or nursing home? If not, talk to your healthcare provider about issuing a DNR (Do-Not-Resuscitate) order.   DNRs and advance directives may not apply during anesthesia, in emergency rooms, or when emergency medical teams respond to a 911 call. Ask your healthcare provider how you can make sure your wishes will be followed. Also, a DNR will not prevent you from getting other kinds of needed medical care such as treatment for pain, or bleeding.   Shannan last reviewed this educational content on 8/1/2021 © 2000-2021 [employer]. All rights reserved. This information is not intended as a substitute for professional medical care. Always follow your healthcare professional's instructions.          Stopping Life-Sustaining Treatments  Certain treatments can help sustain life when you have a serious illness. But as your illness progresses, there may come a time when these treatments are no longer a benefit. Decisions must then be made whether to continue  or stop these treatments. This can be a hard task for you and your loved ones, but know that you’re not alone. Your healthcare provider and healthcare team will guide you through these treatment decisions. They will also help answer any questions you may have.     What are life-sustaining treatments?  Life-sustaining treatments help keep you alive if a vital body function fails. These treatments can include CPR and the use of machines to help with heart, lung, or kidney function. They can also include the use of tubes to deliver food, fluids, blood, and medicines to the body. Blood transfusions and antibiotics are also types of life-sustaining treatments.   What happens if life-sustaining treatments are continued?  These treatments can help extend your life. But they will not cure your illness. If you are near the end of your life, you may find it hard to handle the side effects and problems that can occur with these treatments. In this case, the treatments may be too much of a burden on your body. They may cause more harm than good. They may also disrupt the natural dying process and prolong suffering.   What happens if life-sustaining treatments are stopped?  If these treatments are stopped, the focus of treatment will shift to comfort care. This involves measures to control pain and other symptoms you may have. These measures are not meant to cure your illness or help you live longer. Instead, they are meant to improve your quality of life during the time you have left. If you are in the end stage of your illness, you may be referred to hospice by your healthcare provider. Hospice provides end-of-life care. This includes emotional, spiritual, and social support for you and your loved ones.   How do I decide whether to stop life-sustaining treatments?  Your healthcare provider and healthcare team will talk with you about the specific treatments that are part of your care plan. If you want, you may include family  and friends in these meetings. Here are some questions to think about or ask your healthcare team:   Is there is a chance that my illness will improve? Or will it continue to get worse?  What are my goals of care? Do I want to extend the time I have left? Or do I want to focus my care on comfort and managing symptoms?  How will stopping or continuing these treatments affect my health? Will they enhance my comfort and quality of life? Or will they cause more problems?  Consider your own values or roderick. Also ask for advice from those who share your values.  How do I state my decisions about life-sustaining treatments?  Once you’ve made your decision to continue or stop specific treatments, you can tell your healthcare provider directly. It's best to also put your treatment wishes in writing with advance directives. These are legal forms related to healthcare decisions. Laws about advance directives vary from state to state. Ask your healthcare provider about what forms are needed to make sure your wishes will be followed. Some common forms include:   A durable power of  for healthcare or a healthcare proxy form.  This form lets you name a person to make treatment decisions for you when you can’t. This person is often called a healthcare proxy, medical or healthcare power of , or agent.  A living will.  This form tells others the kinds of treatment you want or don’t want if you become too ill or injured to speak for yourself.  Orders for life-sustaining treatments.  These are actual healthcare provider's orders that must be followed by other medical providers. The form belongs to you, not to the healthcare provider or hospital.). These are legal forms obtained from your healthcare provider or hospital that document your wishes. The forms are known by different names depending on the state. Common names include:  MOLST (medical orders for life-sustaining treatment)  POLST (physician orders for  life-sustaining treatment)  MOST (medical orders for scope of treatment)  POST (physician orders for scope of treatment)  TPOPP (transportable physician orders for patient preferences)     Keep in mind that you can change or cancel an advance directive at any time. Make it a practice to review your decisions each time there is a change in your health or goals of care. Also be sure to tell your healthcare proxy and loved ones of any changes in your decisions.   Deciding whether to stop life-sustaining treatments for a loved one   The decision to stop treatment ideally is made with the person’s consent. If the person is not capable of making decisions and has no advance directive, the decision falls to the person’s healthcare proxy or other adult. If you need to make a decision about stopping treatment for a loved one, start by talking to his or her healthcare provider. Review the goals of care and the benefits and burdens of specific treatments on your loved one’s health. Also think about your loved one’s wishes and values. If needed, seek advice from other healthcare team members, like a  or .   [employer] last reviewed this educational content on 12/1/2019    © 8356-1766 [employer]. All rights reserved. This information is not intended as a substitute for professional medical care. Always follow your healthcare professional's instructions.  Advance Care Planning done today:   She does have a Living Will but we do NOT have it on file in Epic.    She does have a POA but we do NOT have it on file in Epic.       Discussed Advance Care Planning with patient and patient declined resource information.  16+ minutes was spent with all Advanced Care Planning elements today (up to 30 minutes for CPT 34414)  Advance care planning including the explanation and discussion of advance directives standard forms performed Face to Face with patient and Family/surrogate (if present), and  forms available to patient in AVS         [1]   Allergies  Allergen Reactions    Tetracycline RASH           Statins MYALGIA     Tried Lipitor    Wheat Gluten MYALGIA    Gluten Meal OTHER (SEE COMMENTS)     \"Debilitating\"  \"Debilitating\"      Losartan UNKNOWN    Other UNKNOWN     \"mycins\"    Azithromycin ITCHING    Ezetimibe DIARRHEA    Levaquin [Levofloxacin] RASH    Lisinopril Coughing    Omeprazole DIARRHEA     Sig diarrhea with omeprazole, no sob/cp    Oxytetracycline RASH    Oxytetracycline RASH

## 2025-01-27 ENCOUNTER — LAB ENCOUNTER (OUTPATIENT)
Dept: LAB | Facility: HOSPITAL | Age: 65
End: 2025-01-27
Attending: INTERNAL MEDICINE
Payer: MEDICARE

## 2025-01-27 DIAGNOSIS — E04.1 THYROID NODULE: ICD-10-CM

## 2025-01-27 DIAGNOSIS — R73.9 HYPERGLYCEMIA: ICD-10-CM

## 2025-01-27 DIAGNOSIS — E78.2 MIXED HYPERLIPIDEMIA: ICD-10-CM

## 2025-01-27 DIAGNOSIS — K90.41 NON-CELIAC GLUTEN SENSITIVITY: ICD-10-CM

## 2025-01-27 DIAGNOSIS — E04.9 GOITER: ICD-10-CM

## 2025-01-27 DIAGNOSIS — E83.52 SERUM CALCIUM ELEVATED: ICD-10-CM

## 2025-01-27 DIAGNOSIS — Z00.00 ADULT GENERAL MEDICAL EXAMINATION: ICD-10-CM

## 2025-01-27 PROBLEM — E53.8 B12 DEFICIENCY: Status: ACTIVE | Noted: 2025-01-27

## 2025-01-27 PROBLEM — E11.9 TYPE 2 DIABETES MELLITUS WITHOUT COMPLICATION, WITHOUT LONG-TERM CURRENT USE OF INSULIN (HCC): Status: ACTIVE | Noted: 2025-01-27

## 2025-01-27 LAB
ALBUMIN SERPL-MCNC: 5 G/DL (ref 3.2–4.8)
ALBUMIN/GLOB SERPL: 1.8 {RATIO} (ref 1–2)
ALP LIVER SERPL-CCNC: 87 U/L
ALT SERPL-CCNC: 13 U/L
ANION GAP SERPL CALC-SCNC: 9 MMOL/L (ref 0–18)
AST SERPL-CCNC: 16 U/L (ref ?–34)
BASOPHILS # BLD AUTO: 0.05 X10(3) UL (ref 0–0.2)
BASOPHILS NFR BLD AUTO: 0.5 %
BILIRUB SERPL-MCNC: 0.5 MG/DL (ref 0.2–1.1)
BUN BLD-MCNC: 15 MG/DL (ref 9–23)
BUN/CREAT SERPL: 14.4 (ref 10–20)
CALCIUM BLD-MCNC: 10.3 MG/DL (ref 8.7–10.4)
CHLORIDE SERPL-SCNC: 105 MMOL/L (ref 98–112)
CHOLEST SERPL-MCNC: 302 MG/DL (ref ?–200)
CO2 SERPL-SCNC: 25 MMOL/L (ref 21–32)
CREAT BLD-MCNC: 1.04 MG/DL
DEPRECATED RDW RBC AUTO: 45.6 FL (ref 35.1–46.3)
EGFRCR SERPLBLD CKD-EPI 2021: 59 ML/MIN/1.73M2 (ref 60–?)
EOSINOPHIL # BLD AUTO: 0.42 X10(3) UL (ref 0–0.7)
EOSINOPHIL NFR BLD AUTO: 4.6 %
ERYTHROCYTE [DISTWIDTH] IN BLOOD BY AUTOMATED COUNT: 14.1 % (ref 11–15)
EST. AVERAGE GLUCOSE BLD GHB EST-MCNC: 140 MG/DL (ref 68–126)
FASTING PATIENT LIPID ANSWER: YES
FASTING STATUS PATIENT QL REPORTED: YES
GLOBULIN PLAS-MCNC: 2.8 G/DL (ref 2–3.5)
GLUCOSE BLD-MCNC: 108 MG/DL (ref 70–99)
HBA1C MFR BLD: 6.5 % (ref ?–5.7)
HCT VFR BLD AUTO: 41.8 %
HDLC SERPL-MCNC: 43 MG/DL (ref 40–59)
HGB BLD-MCNC: 13.2 G/DL
IMM GRANULOCYTES # BLD AUTO: 0.05 X10(3) UL (ref 0–1)
IMM GRANULOCYTES NFR BLD: 0.5 %
LDLC SERPL CALC-MCNC: 194 MG/DL (ref ?–100)
LYMPHOCYTES # BLD AUTO: 2.07 X10(3) UL (ref 1–4)
LYMPHOCYTES NFR BLD AUTO: 22.7 %
MCH RBC QN AUTO: 28.1 PG (ref 26–34)
MCHC RBC AUTO-ENTMCNC: 31.6 G/DL (ref 31–37)
MCV RBC AUTO: 89.1 FL
MONOCYTES # BLD AUTO: 0.82 X10(3) UL (ref 0.1–1)
MONOCYTES NFR BLD AUTO: 9 %
NEUTROPHILS # BLD AUTO: 5.7 X10 (3) UL (ref 1.5–7.7)
NEUTROPHILS # BLD AUTO: 5.7 X10(3) UL (ref 1.5–7.7)
NEUTROPHILS NFR BLD AUTO: 62.7 %
NONHDLC SERPL-MCNC: 259 MG/DL (ref ?–130)
OSMOLALITY SERPL CALC.SUM OF ELEC: 289 MOSM/KG (ref 275–295)
PLATELET # BLD AUTO: 354 10(3)UL (ref 150–450)
PLATELETS.RETICULATED NFR BLD AUTO: 5.7 % (ref 0–7)
POTASSIUM SERPL-SCNC: 4.2 MMOL/L (ref 3.5–5.1)
PROT SERPL-MCNC: 7.8 G/DL (ref 5.7–8.2)
RBC # BLD AUTO: 4.69 X10(6)UL
SODIUM SERPL-SCNC: 139 MMOL/L (ref 136–145)
TRIGL SERPL-MCNC: 326 MG/DL (ref 30–149)
TSI SER-ACNC: 2.29 UIU/ML (ref 0.55–4.78)
VIT B12 SERPL-MCNC: 395 PG/ML (ref 211–911)
VIT D+METAB SERPL-MCNC: 36.8 NG/ML (ref 30–100)
VLDLC SERPL CALC-MCNC: 71 MG/DL (ref 0–30)
WBC # BLD AUTO: 9.1 X10(3) UL (ref 4–11)

## 2025-01-27 PROCEDURE — 80053 COMPREHEN METABOLIC PANEL: CPT

## 2025-01-27 PROCEDURE — 82607 VITAMIN B-12: CPT

## 2025-01-27 PROCEDURE — 36415 COLL VENOUS BLD VENIPUNCTURE: CPT

## 2025-01-27 PROCEDURE — 82306 VITAMIN D 25 HYDROXY: CPT

## 2025-01-27 PROCEDURE — 80061 LIPID PANEL: CPT

## 2025-01-27 PROCEDURE — 83036 HEMOGLOBIN GLYCOSYLATED A1C: CPT

## 2025-01-27 PROCEDURE — 85025 COMPLETE CBC W/AUTO DIFF WBC: CPT

## 2025-01-27 PROCEDURE — 84443 ASSAY THYROID STIM HORMONE: CPT

## 2025-01-28 ENCOUNTER — TELEPHONE (OUTPATIENT)
Dept: ENDOCRINOLOGY | Facility: HOSPITAL | Age: 65
End: 2025-01-28

## 2025-01-28 NOTE — TELEPHONE ENCOUNTER
Rcvd order from Dr. Valentino for DSMT & MNT. Called patient and LVM to schedule, provided office phone number.

## 2025-01-28 NOTE — PROGRESS NOTES
CBC Normal (white blood cells and red blood cells and platelets),   CMP Normal (electrolytes, and liver functions), slight decline in kidney function from prior.  This may be from dehydration.  Would recommend repeat labs in 1 week nonfasting with at least 8 ounces of water prior to retesting.  The due to intolerances in the past  B12 level is slightly low.  Goal is between 400-800.  Would recommend over the counter B12 thousand Monday Wednesday Friday regimen.  Will recheck B12 level in 3 months.  Orders written.  Vitamin D level looks good.  Continue the same.  Lipid (choilesterol) is elevated.  Goal triglycerides should be less than 150.  Triglycerides are elevated higher carbs.  Lower carbs helps lower triglycerides.  Goal LDL should be less than 70.  Goal HDL should be above 50.  HDL improves with cardiovascular sustained aerobic exercise at least 30 minutes 3-4 times a week.Careful with diet.  Avoid red meat, shellfish, pork.  Try not to boland foods.  Lower carb diet.  Exercise is most part at least 30 minutes 3-4 times a week sustained cardiovascular aerobic exercise getting that heart rate going and keeping it going for 30 minutes at a time.  If cannot do 30 will start with 10 minutes of brisk walking is good at each week build up 5 minutes more.  Recheck lipid in 3 months.  Due to intolerance in the past with statins.  Recommend following up with cardiology will need something to help lower cholesterol.  Thyroid is good.   Hemoglobin A1c which is a 3-month average of sugars is now consistent with diabetes.  6.5 or above.  Will recommend diabetic teaching.  Would recommend also checking sugars.  Can schedule a nurse only visit to show how to do Accu-Chek machine.  Would also recommend eye exam every year and make sure they give us the information after the eye exam done.  Full diabetic eye exam.  Would also recommend a urine test for diabetes.  Orders placed.  Can do at the lab.  Will follow-up after seeing  diabetic teaching.

## 2025-01-30 NOTE — TELEPHONE ENCOUNTER
Patient called and LVM at 12pm requesting a callback to schedule. Called patient back at 1:10pm and LVM with our office phone number.

## 2025-01-31 ENCOUNTER — NURSE ONLY (OUTPATIENT)
Dept: INTERNAL MEDICINE CLINIC | Facility: CLINIC | Age: 65
End: 2025-01-31

## 2025-01-31 VITALS — SYSTOLIC BLOOD PRESSURE: 160 MMHG | DIASTOLIC BLOOD PRESSURE: 97 MMHG | HEART RATE: 82 BPM

## 2025-01-31 DIAGNOSIS — Z01.30 BP CHECK: Primary | ICD-10-CM

## 2025-02-04 ENCOUNTER — HOSPITAL ENCOUNTER (OUTPATIENT)
Dept: ENDOCRINOLOGY | Facility: HOSPITAL | Age: 65
Discharge: HOME | End: 2025-02-04
Attending: INTERNAL MEDICINE
Payer: MEDICARE

## 2025-02-04 ENCOUNTER — LAB ENCOUNTER (OUTPATIENT)
Dept: LAB | Facility: HOSPITAL | Age: 65
End: 2025-02-04
Attending: INTERNAL MEDICINE
Payer: MEDICARE

## 2025-02-04 ENCOUNTER — HOSPITAL ENCOUNTER (OUTPATIENT)
Dept: MAMMOGRAPHY | Facility: HOSPITAL | Age: 65
Discharge: HOME | End: 2025-02-04
Attending: INTERNAL MEDICINE
Payer: MEDICARE

## 2025-02-04 VITALS — WEIGHT: 216.38 LBS | BODY MASS INDEX: 34 KG/M2

## 2025-02-04 DIAGNOSIS — Z12.31 BREAST CANCER SCREENING BY MAMMOGRAM: ICD-10-CM

## 2025-02-04 DIAGNOSIS — E11.9 TYPE 2 DIABETES MELLITUS WITHOUT COMPLICATION, WITHOUT LONG-TERM CURRENT USE OF INSULIN (HCC): ICD-10-CM

## 2025-02-04 DIAGNOSIS — R73.9 HYPERGLYCEMIA: ICD-10-CM

## 2025-02-04 DIAGNOSIS — E11.9 TYPE 2 DIABETES MELLITUS WITHOUT COMPLICATION, WITHOUT LONG-TERM CURRENT USE OF INSULIN (HCC): Primary | ICD-10-CM

## 2025-02-04 LAB
ANION GAP SERPL CALC-SCNC: 9 MMOL/L (ref 0–18)
BUN BLD-MCNC: 15 MG/DL (ref 9–23)
BUN/CREAT SERPL: 15 (ref 10–20)
CALCIUM BLD-MCNC: 9.2 MG/DL (ref 8.7–10.4)
CHLORIDE SERPL-SCNC: 103 MMOL/L (ref 98–112)
CO2 SERPL-SCNC: 25 MMOL/L (ref 21–32)
CREAT BLD-MCNC: 1 MG/DL
CREAT UR-SCNC: 36.2 MG/DL
EGFRCR SERPLBLD CKD-EPI 2021: 62 ML/MIN/1.73M2 (ref 60–?)
FASTING STATUS PATIENT QL REPORTED: NO
GLUCOSE BLD-MCNC: 106 MG/DL (ref 70–99)
MICROALBUMIN UR-MCNC: 0.6 MG/DL
MICROALBUMIN/CREAT 24H UR-RTO: 16.6 UG/MG (ref ?–30)
OSMOLALITY SERPL CALC.SUM OF ELEC: 285 MOSM/KG (ref 275–295)
POTASSIUM SERPL-SCNC: 4.4 MMOL/L (ref 3.5–5.1)
SODIUM SERPL-SCNC: 137 MMOL/L (ref 136–145)

## 2025-02-04 PROCEDURE — 77063 BREAST TOMOSYNTHESIS BI: CPT | Performed by: INTERNAL MEDICINE

## 2025-02-04 PROCEDURE — 77067 SCR MAMMO BI INCL CAD: CPT | Performed by: INTERNAL MEDICINE

## 2025-02-04 PROCEDURE — 82043 UR ALBUMIN QUANTITATIVE: CPT

## 2025-02-04 PROCEDURE — 80048 BASIC METABOLIC PNL TOTAL CA: CPT

## 2025-02-04 PROCEDURE — 36415 COLL VENOUS BLD VENIPUNCTURE: CPT

## 2025-02-04 PROCEDURE — 82570 ASSAY OF URINE CREATININE: CPT

## 2025-02-06 NOTE — PROGRESS NOTES
BMP Normal (electrolytes, kidney function),   Urine shows no protein spillage from the effect of diabetes.

## 2025-02-27 ENCOUNTER — PATIENT MESSAGE (OUTPATIENT)
Dept: INTERNAL MEDICINE CLINIC | Facility: CLINIC | Age: 65
End: 2025-02-27

## 2025-02-28 NOTE — TELEPHONE ENCOUNTER
Dr REBECA Valentino,    See patients' [employer] message and attachment for blood pressure and glucose readings   (2 pages).    Please respond directly to the patient if no additional staff support is required.         Please reply to pool: EM RN TRIAGE

## 2025-04-10 ENCOUNTER — PATIENT MESSAGE (OUTPATIENT)
Dept: INTERNAL MEDICINE CLINIC | Facility: CLINIC | Age: 65
End: 2025-04-10

## 2025-04-10 NOTE — TELEPHONE ENCOUNTER
Mobeon message sent to patient.    Future Appointments   Date Time Provider Department Center   4/25/2025  1:00 PM Maegan Valentino MD [facility] [place]

## 2025-04-11 NOTE — TELEPHONE ENCOUNTER
I am not sure why these tests are being ordered?  There is nothing we do in terms of treatment with the these blood work we treat the LDL and HDL..  I always preorder labs for future appointments.   And are not sure insurance will cover these testing.

## 2025-04-23 RX ORDER — METOPROLOL SUCCINATE 50 MG/1
50 TABLET, EXTENDED RELEASE ORAL 2 TIMES DAILY
Qty: 180 TABLET | Refills: 3 | Status: SHIPPED | OUTPATIENT
Start: 2025-04-23

## 2025-04-23 NOTE — TELEPHONE ENCOUNTER
Please review.  Protocol failed/has no protocol    Last Office Visit: 01/24/2025  Requested Prescriptions     Pending Prescriptions Disp Refills    METOPROLOL SUCCINATE ER 50 MG Oral Tablet 24 Hr [Pharmacy Med Name: METOPROLOL SUCC ER 50 MG TAB] 180 tablet 0     Sig: TAKE 1 TABLET BY MOUTH 2 TIMES A DAY

## (undated) DIAGNOSIS — Z90.49 S/P LAPAROSCOPIC-ASSISTED SIGMOIDECTOMY: ICD-10-CM

## (undated) DIAGNOSIS — Z87.19 HX OF DIVERTICULITIS OF COLON: ICD-10-CM

## (undated) DIAGNOSIS — K57.92 ACUTE DIVERTICULITIS: ICD-10-CM

## (undated) DIAGNOSIS — D72.829 LEUKOCYTOSIS, UNSPECIFIED TYPE: ICD-10-CM

## (undated) DIAGNOSIS — Z02.9 ENCOUNTERS FOR UNSPECIFIED ADMINISTRATIVE PURPOSE: ICD-10-CM

## (undated) DIAGNOSIS — Z13.6 SCREENING FOR CARDIOVASCULAR CONDITION: Primary | ICD-10-CM

## (undated) NOTE — LETTER
[facility] ULTRASOUND  [address] [facility] [address]  Authorization for Imaging Procedure  Date of Procedure:     I hereby authorize  ____________, my physician and his/her assistants (if applicable), which may include medical students, residents, and/or fellows, to perform the following procedure and administer such anesthesia as may be determined necessary by my physician: ULTRASOUND GUIDED FINE NEEDLE ASPIRATION BIOPSY OF RIGHT ISTHMUS THYROID NODULE  on Izabela Evans.   2.  I recognize that during the procedure, unforeseen conditions may necessitate additional or different procedures than those listed above. I, therefore, further authorize and request that the above-named physician, assistants, or designees perform such procedures as are, in their judgment, necessary and desirable.    3.  My physician has discussed prior to my procedure the potential benefits, risks and side effects of this procedure; the likelihood of achieving goals; and potential problems that might occur during recuperation. They also discussed reasonable alternatives to the procedure, including risks, benefits, and side effects related to the alternatives and risks related to not receiving this procedure. I have had all my questions answered and I acknowledge that no guarantee has been made as to the result that may be obtained.    4.  Should the need arise during my procedure, which includes change of level of care prior to discharge, I also consent to the administration of blood and/or blood products. Further, I understand that despite careful testing and screening of blood or blood products by collecting agencies, I may still be subject to ill effects as a result of receiving a blood transfusion and/or blood products. The following are some, but not all, of the potential risks that can occur: fever and allergic reactions, hemolytic reactions, transmission of diseases such as Hepatitis, AIDS and  Cytomegalovirus (CMV) and fluid overload. In the event that I wish to have an autologous transfusion of my own blood, or a directed donor transfusion, I will discuss this with my physician.  Check only if Refusing Blood or Blood Products  I understand refusal of blood or blood products as deemed necessary by my physician may have serious consequences to my condition to include possible death. I hereby assume responsibility for my refusal and release the hospital, its personnel, and my physicians from any responsibility for the consequences of my refusal.   [  ] Patient Refuses Blood      5.  I authorize the use of any specimen, organs, tissues, body parts or foreign objects that may be removed from my body during the procedure for diagnosis, research or teaching purposes and their subsequent disposal by hospital authorities. I also authorize the release of specimen test results and/or written reports to my treating physician on the hospital medical staff or other referring or consulting physicians involved in my care, at the discretion of the Pathologist or my treating physician.    6.  I consent to the photographing or videotaping of the procedures to be performed, including appropriate portions of my body for medical, scientific, or educational purposes, provided my identity is not revealed by the pictures or by descriptive texts accompanying them. If the procedure has been photographed/videotaped, the physician will obtain the original picture, image, videotape or CD. The hospital will not be responsible for storage, release or maintenance of the picture, image, tape or CD.   7.  I consent to the presence of a  or observers in the operating room as deemed necessary by my physician or their designees.    8.  I recognize that in the event my procedure results in extended X-Ray/fluoroscopy time, I may develop a skin reaction.    9.  If I have a Do Not Attempt Resuscitation (DNAR) order in place,  that status will be suspended while in the operating room, procedural suite, and during the recovery period unless otherwise explicitly stated by me (or a person authorized to consent on my behalf). The performing physician or my attending physician will determine when the applicable recovery period ends for purposes of reinstating the DNAR order.  10.  I acknowledge that my physician has explained sedation/analgesia administration to me including the risk and benefits I consent to the administration of sedation/analgesia as may be necessary or desirable in the judgment of my physician.      I CERTIFY THAT I HAVE READ AND FULLY UNDERSTAND THE ABOVE CONSENT FOR THE PROCEDURE.   Signature of Patient: _____________________________________________________________  Responsible person in case of minor, unconscious: ____________________________________  Relationship to patient:  __________________________________________________________  Signature of Witness: _______________________________Date: _________Time: __________    Statement of Physician: My signature below affirms that prior to the time of the procedure, I have explained to the patient and/or her guardian, the risks and benefits involved in the proposed treatment and any reasonable alternative to the proposed treatment. I have also explained the risks and benefits involved in the refusal of the proposed treatment and have answered the patient's questions. If I have a significant financial interest in a co-management agreement or a significant financial interest in any product or implant, or other significant relationship used in the procedure/surgery, I have disclosed this and had a discussion with my patient.  Signature of Physician:   _________________________________Date:_____________Time:________    Patient Name: Izabela Calderon Samminaseem : 2/15/1960  Printed: 2024   Medical Record #: [id number]

## (undated) NOTE — Clinical Note
MARQUESI, TCM call made, see notes. NCM attempted to schedule TCM HFU within the TCM timeframe by 4/11/2022, patient declines stating she will be out of town not returning until 4/26/2022. Next scheduled appointment is 5/15/2022 at 11:30. Message sent to MD's office.